# Patient Record
Sex: FEMALE | Race: BLACK OR AFRICAN AMERICAN | Employment: FULL TIME | URBAN - METROPOLITAN AREA
[De-identification: names, ages, dates, MRNs, and addresses within clinical notes are randomized per-mention and may not be internally consistent; named-entity substitution may affect disease eponyms.]

---

## 2017-09-21 ENCOUNTER — OFFICE VISIT (OUTPATIENT)
Dept: INTERNAL MEDICINE CLINIC | Age: 62
End: 2017-09-21

## 2017-09-21 VITALS
HEART RATE: 86 BPM | WEIGHT: 179 LBS | DIASTOLIC BLOOD PRESSURE: 81 MMHG | RESPIRATION RATE: 18 BRPM | TEMPERATURE: 98.6 F | SYSTOLIC BLOOD PRESSURE: 148 MMHG | HEIGHT: 66 IN | BODY MASS INDEX: 28.77 KG/M2 | OXYGEN SATURATION: 98 %

## 2017-09-21 DIAGNOSIS — Z00.00 WELL ADULT ON ROUTINE HEALTH CHECK: Primary | ICD-10-CM

## 2017-09-21 DIAGNOSIS — E04.2 MULTINODULAR GOITER: ICD-10-CM

## 2017-09-21 NOTE — MR AVS SNAPSHOT
Visit Information Date & Time Provider Department Dept. Phone Encounter #  
 9/21/2017  8:00 AM Angel Sullivan MD [facility] [phone] [id number] Follow-up Instructions Return in about 6 months (around 3/21/2018). Upcoming Health Maintenance Date Due DTaP/Tdap/Td series (1 - Tdap) 9/18/1978 ZOSTER VACCINE AGE 60> 7/18/2017 INFLUENZA AGE 9 TO ADULT 8/1/2017 BREAST CANCER SCRN MAMMOGRAM 10/26/2017 PAP AKA CERVICAL CYTOLOGY 12/18/2018 COLONOSCOPY 4/22/2019 Allergies as of 9/21/2017  Review Complete On: 9/21/2017 By: Julia Ochoa MD  
 No Known Allergies Current Immunizations  Reviewed on 12/21/2015 Name Date Influenza Vaccine 9/7/2017, 10/1/2015 Not reviewed this visit You Were Diagnosed With   
  
 Codes Comments Well adult on routine health check    -  Primary ICD-10-CM: Z00.00 ICD-9-CM: V70.0 Multinodular goiter     ICD-10-CM: E04.2 ICD-9-CM: 402. 1 Vitals BP Pulse Temp Resp Height(growth percentile) Weight(growth percentile) 148/81 (BP 1 Location: Left arm, BP Patient Position: Sitting) 86 98.6 °F (37 °C) (Oral) 18 5' 6\" (1.676 m) 179 lb (81.2 kg) LMP SpO2 BMI OB Status Smoking Status 11/08/2010 98% 28.89 kg/m2 Hysterectomy Former Smoker Vitals History BMI and BSA Data Body Mass Index Body Surface Area  
 28.89 kg/m 2 1.94 m 2 Preferred Pharmacy Pharmacy Name Phone [id number], [address] [phone] Your Updated Medication List  
  
   
This list is accurate as of: 9/21/17  8:35 AM.  Always use your most recent med list.  
  
  
  
  
 DIVIGEL 1 mg/gram (0.1 %) Glpk Generic drug:  Estradiol We Performed the Following CBC WITH AUTOMATED DIFF [85948 CPT(R)] LIPID PANEL [13703 CPT(R)] METABOLIC PANEL, COMPREHENSIVE [91613 CPT(R)] TSH AND FREE T4 [63170 CPT(R)] Follow-up Instructions Return in about 6 months (around 3/21/2018). To-Do List   
 09/21/2017 Imaging:  SADA MAMMO BI SCREENING INCL CAD   
  
 09/21/2017 Imaging:  US THYROID/PARATHYROID/SOFT TISS Introducing [facility]! Dear Moe Arch: Thank you for requesting a Sharetribe account. Our records indicate that you already have an active Sharetribe account. You can access your account anytime at [email or web address] Did you know that you can access your hospital and ER discharge instructions at any time in Sharetribe? You can also review all of your test results from your hospital stay or ER visit. Additional Information If you have questions, please visit the Frequently Asked Questions section of the Sharetribe website at [email or web address] Remember, Sharetribe is NOT to be used for urgent needs. For medical emergencies, dial 911. Now available from your iPhone and Android! Please provide this summary of care documentation to your next provider. Your primary care clinician is listed as Angel Beckett. If you have any questions after today's visit, please call [phone].

## 2017-09-21 NOTE — PROGRESS NOTES
Subjective:   63 y.o. female for Well Woman Check. Multinodular goitre. Need a follow up [address]. Patient is asymptomatic. Her gyne and breast care is done elsewhere by her Ob-Gyne physician. Patient Active Problem List   Diagnosis Code    Lymphadenopathy R59.1    Multinodular goiter E04.2    Post menopausal problems N95.9    Hyperlipidemia E78.5    HTN (hypertension) I10     Current Outpatient Prescriptions   Medication Sig Dispense Refill    DIVIGEL 1 mg (0.1 %) glpk        No Known Allergies  Past Medical History:   Diagnosis Date    Hypercholesterolemia     Hypertension     Thyroid disease      Past Surgical History:   Procedure Laterality Date    HX CYST INCISION AND DRAINAGE Left 2010    benign    HX FRACTURE TX Right     HX GYN      tubal ligation.  Fibroid removal.    HX HYSTERECTOMY  03/2016     Family History   Problem Relation Age of Onset    No Known Problems Mother     Cancer Father      Lung cancer    Diabetes Brother     Diabetes Brother      Social History   Substance Use Topics    Smoking status: Former Smoker    Smokeless tobacco: Never Used    Alcohol use Yes      Comment: social        Lab Results  Component Value Date/Time   WBC 4.8 12/21/2015 09:19 AM   HGB 14.1 12/21/2015 09:19 AM   HCT 42.6 12/21/2015 09:19 AM   PLATELET 016 04/35/7351 09:19 AM   MCV 91 12/21/2015 09:19 AM     Lab Results  Component Value Date/Time   Cholesterol, total 223 12/21/2015 09:19 AM   HDL Cholesterol 71 12/21/2015 09:19 AM   LDL, calculated 140 12/21/2015 09:19 AM   Triglyceride 62 12/21/2015 09:19 AM   Lab Results  Component Value Date/Time   GFR est non-AA 76 12/21/2015 09:19 AM   GFR est AA 87 12/21/2015 09:19 AM   Creatinine 0.85 12/21/2015 09:19 AM   BUN 11 12/21/2015 09:19 AM   Sodium 141 12/21/2015 09:19 AM   Potassium 3.9 12/21/2015 09:19 AM   Chloride 104 12/21/2015 09:19 AM   CO2 23 12/21/2015 09:19 AM   Lab Results  Component Value Date/Time   TSH 0.487 12/21/2015 09:19 AM   T4, Free 1.35 12/21/2015 09:19 AM                     ROS: Feeling generally well. No TIA's or unusual headaches, no dysphagia. No prolonged cough. No dyspnea or chest pain on exertion. No abdominal pain, change in bowel habits, black or bloody stools. No urinary tract symptoms. No new or unusual musculoskeletal symptoms. Specific concerns today: none. Objective: The patient appears well, alert, oriented x 3, in no distress. Visit Vitals    /81 (BP 1 Location: Left arm, BP Patient Position: Sitting)    Pulse 86    Temp 98.6 °F (37 °C) (Oral)    Resp 18    Ht 5' 6\" (1.676 m)    Wt 179 lb (81.2 kg)    LMP 11/08/2010    SpO2 98%    BMI 28.89 kg/m2     ENT normal.  Neck supple. No adenopathy. Multinodular goitre on exam more on right. ALVARO. Lungs are clear, good air entry, no wheezes, rhonchi or rales. S1 and S2 normal, no murmurs, regular rate and rhythm. Abdomen soft without tenderness, guarding, mass or organomegaly. Extremities show no edema, normal peripheral pulses. Neurological is normal, no focal findings. Breast and Pelvic exams are deferred. Skin: normal.     Assessment/Plan:   Well Woman  lose weight, increase physical activity, follow low fat diet, follow low salt diet, routine labs ordered, call if any problems    ICD-10-CM ICD-9-CM    1. Well adult on routine health check Z00.00 V70.0 TSH AND FREE T4      LIPID PANEL      METABOLIC PANEL, COMPREHENSIVE      CBC WITH AUTOMATED DIFF      SADA MAMMO BI SCREENING INCL CAD   2. Multinodular goiter E04.2 241.1 US THYROID/PARATHYROID/SOFT TISS      TSH AND FREE T4     Diagnoses and all orders for this visit:    1. Well adult on routine health check  -     TSH AND FREE T4  -     LIPID PANEL  -     METABOLIC PANEL, COMPREHENSIVE  -     CBC WITH AUTOMATED DIFF  -     SADA MAMMO BI SCREENING INCL CAD; Future    2. Multinodular goiter  -     US THYROID/PARATHYROID/SOFT TISS; Future  -     TSH AND FREE T4    BP is slightly up.  Advised to monitor BP and follow up with me if BP stays over 150/90s. Follow-up Disposition:  Return in about 6 months (around 3/21/2018).   reviewed diet, exercise and weight control

## 2017-09-22 LAB
ALBUMIN SERPL-MCNC: 4.3 G/DL (ref 3.6–4.8)
ALBUMIN/GLOB SERPL: 1.7 {RATIO} (ref 1.2–2.2)
ALP SERPL-CCNC: 123 IU/L (ref 39–117)
ALT SERPL-CCNC: 14 IU/L (ref 0–32)
AST SERPL-CCNC: 18 IU/L (ref 0–40)
BASOPHILS # BLD AUTO: 0 X10E3/UL (ref 0–0.2)
BASOPHILS NFR BLD AUTO: 0 %
BILIRUB SERPL-MCNC: 0.5 MG/DL (ref 0–1.2)
BUN SERPL-MCNC: 9 MG/DL (ref 8–27)
BUN/CREAT SERPL: 11 (ref 12–28)
CALCIUM SERPL-MCNC: 9 MG/DL (ref 8.7–10.3)
CHLORIDE SERPL-SCNC: 101 MMOL/L (ref 96–106)
CHOLEST SERPL-MCNC: 209 MG/DL (ref 100–199)
CO2 SERPL-SCNC: 22 MMOL/L (ref 18–29)
CREAT SERPL-MCNC: 0.82 MG/DL (ref 0.57–1)
EOSINOPHIL # BLD AUTO: 0.3 X10E3/UL (ref 0–0.4)
EOSINOPHIL NFR BLD AUTO: 4 %
ERYTHROCYTE [DISTWIDTH] IN BLOOD BY AUTOMATED COUNT: 13.8 % (ref 12.3–15.4)
GLOBULIN SER CALC-MCNC: 2.5 G/DL (ref 1.5–4.5)
GLUCOSE SERPL-MCNC: 88 MG/DL (ref 65–99)
HCT VFR BLD AUTO: 41.3 % (ref 34–46.6)
HDLC SERPL-MCNC: 74 MG/DL
HGB BLD-MCNC: 13.6 G/DL (ref 11.1–15.9)
IMM GRANULOCYTES # BLD: 0 X10E3/UL (ref 0–0.1)
IMM GRANULOCYTES NFR BLD: 0 %
INTERPRETATION, 910389: NORMAL
LDLC SERPL CALC-MCNC: 121 MG/DL (ref 0–99)
LYMPHOCYTES # BLD AUTO: 1.6 X10E3/UL (ref 0.7–3.1)
LYMPHOCYTES NFR BLD AUTO: 24 %
MCH RBC QN AUTO: 30.2 PG (ref 26.6–33)
MCHC RBC AUTO-ENTMCNC: 32.9 G/DL (ref 31.5–35.7)
MCV RBC AUTO: 92 FL (ref 79–97)
MONOCYTES # BLD AUTO: 0.3 X10E3/UL (ref 0.1–0.9)
MONOCYTES NFR BLD AUTO: 5 %
NEUTROPHILS # BLD AUTO: 4.4 X10E3/UL (ref 1.4–7)
NEUTROPHILS NFR BLD AUTO: 67 %
PLATELET # BLD AUTO: 271 X10E3/UL (ref 150–379)
POTASSIUM SERPL-SCNC: 4.2 MMOL/L (ref 3.5–5.2)
PROT SERPL-MCNC: 6.8 G/DL (ref 6–8.5)
RBC # BLD AUTO: 4.51 X10E6/UL (ref 3.77–5.28)
SODIUM SERPL-SCNC: 141 MMOL/L (ref 134–144)
T4 FREE SERPL-MCNC: 1.05 NG/DL (ref 0.82–1.77)
TRIGL SERPL-MCNC: 71 MG/DL (ref 0–149)
TSH SERPL DL<=0.005 MIU/L-ACNC: 0.54 UIU/ML (ref 0.45–4.5)
VLDLC SERPL CALC-MCNC: 14 MG/DL (ref 5–40)
WBC # BLD AUTO: 6.6 X10E3/UL (ref 3.4–10.8)

## 2017-09-22 NOTE — PROGRESS NOTES
Please inform patient that :    Thyroid, CBC, kidney, liver function is fine. Cholesterol level is better than before. Continue work on diet and exercise. Follow up in one year.

## 2017-09-30 ENCOUNTER — HOSPITAL ENCOUNTER (OUTPATIENT)
Dept: ULTRASOUND IMAGING | Age: 62
Discharge: HOME | End: 2017-09-30
Attending: FAMILY MEDICINE
Payer: COMMERCIAL

## 2017-09-30 DIAGNOSIS — E04.2 MULTINODULAR GOITER: ICD-10-CM

## 2017-09-30 PROCEDURE — 76536 US EXAM OF HEAD AND NECK: CPT

## 2017-10-02 NOTE — PROGRESS NOTES
Please call patient:    Multinodular goiter is again seen. The gland overall has enlarged according to the radiologist comment, but they appear relatively unchanged. F/u in one year.

## 2017-10-30 ENCOUNTER — HOSPITAL ENCOUNTER (OUTPATIENT)
Dept: MAMMOGRAPHY | Age: 62
Discharge: HOME | End: 2017-10-30
Attending: FAMILY MEDICINE
Payer: COMMERCIAL

## 2017-10-30 DIAGNOSIS — Z00.00 WELL ADULT ON ROUTINE HEALTH CHECK: ICD-10-CM

## 2017-10-30 PROCEDURE — 77067 SCR MAMMO BI INCL CAD: CPT

## 2018-06-19 ENCOUNTER — OFFICE VISIT (OUTPATIENT)
Dept: INTERNAL MEDICINE CLINIC | Age: 63
End: 2018-06-19

## 2018-06-19 VITALS
RESPIRATION RATE: 18 BRPM | TEMPERATURE: 97.2 F | DIASTOLIC BLOOD PRESSURE: 88 MMHG | BODY MASS INDEX: 28.73 KG/M2 | SYSTOLIC BLOOD PRESSURE: 142 MMHG | OXYGEN SATURATION: 98 % | HEART RATE: 69 BPM | WEIGHT: 178.8 LBS | HEIGHT: 66 IN

## 2018-06-19 DIAGNOSIS — R03.0 ELEVATED BLOOD PRESSURE READING WITHOUT DIAGNOSIS OF HYPERTENSION: ICD-10-CM

## 2018-06-19 DIAGNOSIS — Z78.0 POST-MENOPAUSAL: ICD-10-CM

## 2018-06-19 DIAGNOSIS — Z00.00 WELL ADULT ON ROUTINE HEALTH CHECK: Primary | ICD-10-CM

## 2018-06-19 NOTE — PROGRESS NOTES
Subjective:   63 y.o. female is here for Well Woman Check. Her medical history is significant for goitre. Never been diagnosed with HTN. Follows healthy diet. Her gyne and breast care is done elsewhere by her Ob-Gyne physician. Patient Active Problem List   Diagnosis Code    Lymphadenopathy R59.1    Multinodular goiter E04.2    Post menopausal problems N95.9    Hyperlipidemia E78.5    HTN (hypertension) I10    Elevated blood pressure reading without diagnosis of hypertension R03.0     Current Outpatient Prescriptions   Medication Sig Dispense Refill    DIVIGEL 1 mg (0.1 %) glpk        No Known Allergies  Past Medical History:   Diagnosis Date    Hypercholesterolemia     Hypertension     Menopause     Thyroid disease      Past Surgical History:   Procedure Laterality Date    HX CYST INCISION AND DRAINAGE Left 2010    benign    HX FRACTURE TX Right     HX GYN      tubal ligation. Fibroid removal.    HX HYSTERECTOMY  03/2016     Social History   Substance Use Topics    Smoking status: Former Smoker    Smokeless tobacco: Never Used    Alcohol use Yes      Comment: social        Lab Results  Component Value Date/Time   Cholesterol, total 209 (H) 09/21/2017 08:49 AM   HDL Cholesterol 74 09/21/2017 08:49 AM   LDL, calculated 121 (H) 09/21/2017 08:49 AM   Triglyceride 71 09/21/2017 08:49 AM     Lab Results  Component Value Date/Time   GFR est non-AA 78 09/21/2017 08:49 AM   GFR est AA 90 09/21/2017 08:49 AM   Creatinine 0.82 09/21/2017 08:49 AM   BUN 9 09/21/2017 08:49 AM   Sodium 141 09/21/2017 08:49 AM   Potassium 4.2 09/21/2017 08:49 AM   Chloride 101 09/21/2017 08:49 AM   CO2 22 09/21/2017 08:49 AM     Lab Results  Component Value Date/Time   TSH 0.537 09/21/2017 08:49 AM   T4, Free 1.05 09/21/2017 08:49 AM      Lab Results   Component Value Date/Time    Hemoglobin A1c 5.8 (H) 12/21/2015 09:19 AM         ROS: Feeling generally well. No TIA's or unusual headaches, no dysphagia.   No prolonged cough. No dyspnea or chest pain on exertion. No abdominal pain, change in bowel habits, black or bloody stools. No urinary tract symptoms. No new or unusual musculoskeletal symptoms. Specific concerns today: None. Objective: The patient appears well, alert, oriented x 3, in no distress. Visit Vitals    /84 (BP 1 Location: Left arm, BP Patient Position: Sitting)    Pulse 69    Temp 97.2 °F (36.2 °C) (Temporal)    Resp 18    Ht 5' 6\" (1.676 m)    Wt 178 lb 12.8 oz (81.1 kg)    LMP 11/08/2010    SpO2 98%    BMI 28.86 kg/m2     ENT normal.  Neck supple. No adenopathy or thyromegaly. ALVARO. Lungs are clear, good air entry, no wheezes, rhonchi or rales. S1 and S2 normal, no murmurs, regular rate and rhythm. Abdomen soft without tenderness, guarding, mass or organomegaly. Extremities show no edema, normal peripheral pulses. Neurological is normal, no focal findings. Breast and Pelvic exams are deferred. Assessment/Plan:   Well Woman  lose weight, limit alcohol consumption, follow low fat diet, follow low salt diet, routine labs ordered, call if any problems    ICD-10-CM ICD-9-CM    1. Well adult on routine health check Z00.00 V70.0 CBC WITH AUTOMATED DIFF      TSH AND FREE T4      METABOLIC PANEL, COMPREHENSIVE      LIPID PANEL      HEMOGLOBIN A1C WITH EAG   2. Post-menopausal Z78.0 V49.81 DEXA BONE DENSITY STUDY AXIAL   3. BMI 28.0-28.9,adult Z68.28 V85.24    4. Elevated blood pressure reading without diagnosis of hypertension R03.0 796.2      Diagnoses and all orders for this visit:    1. Well adult on routine health check  -     CBC WITH AUTOMATED DIFF  -     TSH AND FREE T4  -     METABOLIC PANEL, COMPREHENSIVE  -     LIPID PANEL  -     HEMOGLOBIN A1C WITH EAG    2. Post-menopausal  -     DEXA BONE DENSITY STUDY AXIAL; Future    3. Elevated blood pressure reading without diagnosis of hypertension    - low salt diet. - encouraged exercise.     -  Monitor BP / follow up if BP stays over 140/90s. 4. BMI 28.0-28.9,adult  discussed the patient's BMI with her. The BMI follow up plan is as follows:     dietary management education, guidance, and counseling  encourage exercise  monitor weight        Follow-up Disposition:  Return in about 1 year (around 6/19/2019), or if symptoms worsen or fail to improve, for follow up if BP stays over 140/90. reviewed diet, exercise and weight control  reviewed medications and side effects in detail      An After Visit Summary was printed and given to the patient.

## 2018-06-19 NOTE — MR AVS SNAPSHOT
[facility]. [address] [facility] 
[phone] Patient: Janelle Prado MRN: [id number]:[id number] Visit Information Date & Time Provider Department Dept. Phone Encounter #  
 6/19/2018  8:15 AM Angel Traylor MD [facility] [phone] [id number] Follow-up Instructions Return in about 1 year (around 6/19/2019), or if symptoms worsen or fail to improve, for follow up if BP stays over 140/90. Upcoming Health Maintenance Date Due DTaP/Tdap/Td series (1 - Tdap) 9/18/1978 ZOSTER VACCINE AGE 60> 7/18/2017 Influenza Age 5 to Adult 8/1/2018 BREAST CANCER SCRN MAMMOGRAM 10/30/2018 COLONOSCOPY 4/22/2019 Allergies as of 6/19/2018  Review Complete On: 6/19/2018 By: Gaston Love MD  
 No Known Allergies Current Immunizations  Reviewed on 12/21/2015 Name Date Influenza Vaccine 9/7/2017, 10/1/2015 Not reviewed this visit You Were Diagnosed With   
  
 Codes Comments Well adult on routine health check    -  Primary ICD-10-CM: Z00.00 ICD-9-CM: V70.0 Post-menopausal     ICD-10-CM: Z78.0 ICD-9-CM: V49.81   
 BMI 28.0-28.9,adult     ICD-10-CM: T54.09 
ICD-9-CM: V85.24 Elevated blood pressure reading without diagnosis of hypertension     ICD-10-CM: R03.0 ICD-9-CM: 796.2 Vitals BP Pulse Temp Resp Height(growth percentile) Weight(growth percentile) 142/88 69 97.2 °F (36.2 °C) (Temporal) 18 5' 6\" (1.676 m) 178 lb 12.8 oz (81.1 kg) LMP SpO2 BMI OB Status Smoking Status 11/08/2010 98% 28.86 kg/m2 Hysterectomy Former Smoker Vitals History BMI and BSA Data Body Mass Index Body Surface Area  
 28.86 kg/m 2 1.94 m 2 Preferred Pharmacy Pharmacy Name Phone [id number] [place], [address] [phone] Your Updated Medication List  
  
   
 This list is accurate as of 6/19/18  9:11 AM.  Always use your most recent med list.  
  
  
  
  
 DIVIGEL 1 mg/gram (0.1 %) Glpk Generic drug:  Estradiol We Performed the Following CBC WITH AUTOMATED DIFF [32915 CPT(R)] HEMOGLOBIN A1C WITH EAG [05624 CPT(R)] LIPID PANEL [46365 CPT(R)] METABOLIC PANEL, COMPREHENSIVE [35007 CPT(R)] TSH AND FREE T4 [18750 CPT(R)] Follow-up Instructions Return in about 1 year (around 6/19/2019), or if symptoms worsen or fail to improve, for follow up if BP stays over 140/90. To-Do List   
 06/19/2018 Imaging:  DEXA BONE DENSITY STUDY AXIAL Patient Instructions Body Mass Index: Care Instructions Your Care Instructions Body mass index (BMI) can help you see if your weight is raising your risk for health problems. It uses a formula to compare how much you weigh with how tall you are. · A BMI lower than 18.5 is considered underweight. · A BMI between 18.5 and 24.9 is considered healthy. · A BMI between 25 and 29.9 is considered overweight. A BMI of 30 or higher is considered obese. If your BMI is in the normal range, it means that you have a lower risk for weight-related health problems. If your BMI is in the overweight or obese range, you may be at increased risk for weight-related health problems, such as high blood pressure, heart disease, stroke, arthritis or joint pain, and diabetes. If your BMI is in the underweight range, you may be at increased risk for health problems such as fatigue, lower protection (immunity) against illness, muscle loss, bone loss, hair loss, and hormone problems. BMI is just one measure of your risk for weight-related health problems. You may be at higher risk for health problems if you are not active, you eat an unhealthy diet, or you drink too much alcohol or use tobacco products. Follow-up care is a key part of your treatment and safety.  Be sure to make and go to all appointments, and call your doctor if you are having problems. It's also a good idea to know your test results and keep a list of the medicines you take. How can you care for yourself at home? · Practice healthy eating habits. This includes eating plenty of fruits, vegetables, whole grains, lean protein, and low-fat dairy. · If your doctor recommends it, get more exercise. Walking is a good choice. Bit by bit, increase the amount you walk every day. Try for at least 30 minutes on most days of the week. · Do not smoke. Smoking can increase your risk for health problems. If you need help quitting, talk to your doctor about stop-smoking programs and medicines. These can increase your chances of quitting for good. · Limit alcohol to 2 drinks a day for men and 1 drink a day for women. Too much alcohol can cause health problems. If you have a BMI higher than 25 · Your doctor may do other tests to check your risk for weight-related health problems. This may include measuring the distance around your waist. A waist measurement of more than 40 inches in men or 35 inches in women can increase the risk of weight-related health problems. · Talk with your doctor about steps you can take to stay healthy or improve your health. You may need to make lifestyle changes to lose weight and stay healthy, such as changing your diet and getting regular exercise. If you have a BMI lower than 18.5 · Your doctor may do other tests to check your risk for health problems. · Talk with your doctor about steps you can take to stay healthy or improve your health. You may need to make lifestyle changes to gain or maintain weight and stay healthy, such as getting more healthy foods in your diet and doing exercises to build muscle. Where can you learn more? Go to http://irma-chandler.info/. Enter S176 in the search box to learn more about \"Body Mass Index: Care Instructions. \" 
 Current as of: October 13, 2016 Content Version: 11.4 © 6160-9651 Healthwise, Teleborder. Care instructions adapted under license by [employer] (which disclaims liability or warranty for this information). If you have questions about a medical condition or this instruction, always ask your healthcare professional. [address] any warranty or liability for your use of this information. Introducing [facility] & [facility]! Dear Binta French: Thank you for requesting a EnticeLabs account. Our records indicate that you already have an active EnticeLabs account. You can access your account anytime at [email or web address] Did you know that you can access your hospital and ER discharge instructions at any time in EnticeLabs? You can also review all of your test results from your hospital stay or ER visit. Additional Information If you have questions, please visit the Frequently Asked Questions section of the EnticeLabs website at [email or web address] Remember, EnticeLabs is NOT to be used for urgent needs. For medical emergencies, dial 911. Now available from your iPhone and Android! Please provide this summary of care documentation to your next provider. Your primary care clinician is listed as Angel Beckett. If you have any questions after today's visit, please call [phone].

## 2018-06-19 NOTE — PATIENT INSTRUCTIONS
Body Mass Index: Care Instructions  Your Care Instructions    Body mass index (BMI) can help you see if your weight is raising your risk for health problems. It uses a formula to compare how much you weigh with how tall you are. · A BMI lower than 18.5 is considered underweight. · A BMI between 18.5 and 24.9 is considered healthy. · A BMI between 25 and 29.9 is considered overweight. A BMI of 30 or higher is considered obese. If your BMI is in the normal range, it means that you have a lower risk for weight-related health problems. If your BMI is in the overweight or obese range, you may be at increased risk for weight-related health problems, such as high blood pressure, heart disease, stroke, arthritis or joint pain, and diabetes. If your BMI is in the underweight range, you may be at increased risk for health problems such as fatigue, lower protection (immunity) against illness, muscle loss, bone loss, hair loss, and hormone problems. BMI is just one measure of your risk for weight-related health problems. You may be at higher risk for health problems if you are not active, you eat an unhealthy diet, or you drink too much alcohol or use tobacco products. Follow-up care is a key part of your treatment and safety. Be sure to make and go to all appointments, and call your doctor if you are having problems. It's also a good idea to know your test results and keep a list of the medicines you take. How can you care for yourself at home? · Practice healthy eating habits. This includes eating plenty of fruits, vegetables, whole grains, lean protein, and low-fat dairy. · If your doctor recommends it, get more exercise. Walking is a good choice. Bit by bit, increase the amount you walk every day. Try for at least 30 minutes on most days of the week. · Do not smoke. Smoking can increase your risk for health problems. If you need help quitting, talk to your doctor about stop-smoking programs and medicines. These can increase your chances of quitting for good. · Limit alcohol to 2 drinks a day for men and 1 drink a day for women. Too much alcohol can cause health problems. If you have a BMI higher than 25  · Your doctor may do other tests to check your risk for weight-related health problems. This may include measuring the distance around your waist. A waist measurement of more than 40 inches in men or 35 inches in women can increase the risk of weight-related health problems. · Talk with your doctor about steps you can take to stay healthy or improve your health. You may need to make lifestyle changes to lose weight and stay healthy, such as changing your diet and getting regular exercise. If you have a BMI lower than 18.5  · Your doctor may do other tests to check your risk for health problems. · Talk with your doctor about steps you can take to stay healthy or improve your health. You may need to make lifestyle changes to gain or maintain weight and stay healthy, such as getting more healthy foods in your diet and doing exercises to build muscle. Where can you learn more? Go to http://irma-chandler.info/. Enter S176 in the search box to learn more about \"Body Mass Index: Care Instructions. \"  Current as of: October 13, 2016  Content Version: 11.4  © 2018-5765 Healthwise, Incorporated. Care instructions adapted under license by [employer] (which disclaims liability or warranty for this information). If you have questions about a medical condition or this instruction, always ask your healthcare professional. [address] any warranty or liability for your use of this information.

## 2018-06-20 LAB
ALBUMIN SERPL-MCNC: 4.3 G/DL (ref 3.6–4.8)
ALBUMIN/GLOB SERPL: 1.8 {RATIO} (ref 1.2–2.2)
ALP SERPL-CCNC: 126 IU/L (ref 39–117)
ALT SERPL-CCNC: 11 IU/L (ref 0–32)
AST SERPL-CCNC: 15 IU/L (ref 0–40)
BASOPHILS # BLD AUTO: 0 X10E3/UL (ref 0–0.2)
BASOPHILS NFR BLD AUTO: 0 %
BILIRUB SERPL-MCNC: 0.4 MG/DL (ref 0–1.2)
BUN SERPL-MCNC: 10 MG/DL (ref 8–27)
BUN/CREAT SERPL: 11 (ref 12–28)
CALCIUM SERPL-MCNC: 9.5 MG/DL (ref 8.7–10.3)
CHLORIDE SERPL-SCNC: 103 MMOL/L (ref 96–106)
CHOLEST SERPL-MCNC: 209 MG/DL (ref 100–199)
CO2 SERPL-SCNC: 25 MMOL/L (ref 20–29)
CREAT SERPL-MCNC: 0.88 MG/DL (ref 0.57–1)
EOSINOPHIL # BLD AUTO: 0.1 X10E3/UL (ref 0–0.4)
EOSINOPHIL NFR BLD AUTO: 3 %
ERYTHROCYTE [DISTWIDTH] IN BLOOD BY AUTOMATED COUNT: 13.5 % (ref 12.3–15.4)
EST. AVERAGE GLUCOSE BLD GHB EST-MCNC: 114 MG/DL
GFR SERPLBLD CREATININE-BSD FMLA CKD-EPI: 72 ML/MIN/1.73
GFR SERPLBLD CREATININE-BSD FMLA CKD-EPI: 83 ML/MIN/1.73
GLOBULIN SER CALC-MCNC: 2.4 G/DL (ref 1.5–4.5)
GLUCOSE SERPL-MCNC: 96 MG/DL (ref 65–99)
HBA1C MFR BLD: 5.6 % (ref 4.8–5.6)
HCT VFR BLD AUTO: 41.5 % (ref 34–46.6)
HDLC SERPL-MCNC: 66 MG/DL
HGB BLD-MCNC: 13.6 G/DL (ref 11.1–15.9)
IMM GRANULOCYTES # BLD: 0 X10E3/UL (ref 0–0.1)
IMM GRANULOCYTES NFR BLD: 0 %
INTERPRETATION, 910389: NORMAL
LDLC SERPL CALC-MCNC: 126 MG/DL (ref 0–99)
LYMPHOCYTES # BLD AUTO: 1.8 X10E3/UL (ref 0.7–3.1)
LYMPHOCYTES NFR BLD AUTO: 36 %
MCH RBC QN AUTO: 30 PG (ref 26.6–33)
MCHC RBC AUTO-ENTMCNC: 32.8 G/DL (ref 31.5–35.7)
MCV RBC AUTO: 92 FL (ref 79–97)
MONOCYTES # BLD AUTO: 0.3 X10E3/UL (ref 0.1–0.9)
MONOCYTES NFR BLD AUTO: 5 %
NEUTROPHILS # BLD AUTO: 2.8 X10E3/UL (ref 1.4–7)
NEUTROPHILS NFR BLD AUTO: 56 %
PLATELET # BLD AUTO: 279 X10E3/UL (ref 150–379)
POTASSIUM SERPL-SCNC: 4.7 MMOL/L (ref 3.5–5.2)
PROT SERPL-MCNC: 6.7 G/DL (ref 6–8.5)
RBC # BLD AUTO: 4.53 X10E6/UL (ref 3.77–5.28)
SODIUM SERPL-SCNC: 142 MMOL/L (ref 134–144)
T4 FREE SERPL-MCNC: 1.2 NG/DL (ref 0.82–1.77)
TRIGL SERPL-MCNC: 86 MG/DL (ref 0–149)
TSH SERPL DL<=0.005 MIU/L-ACNC: 0.46 UIU/ML (ref 0.45–4.5)
VLDLC SERPL CALC-MCNC: 17 MG/DL (ref 5–40)
WBC # BLD AUTO: 5 X10E3/UL (ref 3.4–10.8)

## 2018-06-20 NOTE — PROGRESS NOTES
Result sent through my chart per pt request.     1. CBC, kidney, liver, thyroid level is within normal range. 2. Total cholesterol level and the bad cholesterol level is  mildly elevated and almost the same range as last time. No need to start any medication. Continue watching your diet, eat healthy, less moore and fatty food, more baked or grilled or broiled food. Exercise 150 minutes/week will be helpful as well. Will recheck level in one year.       3. No diabetes

## 2018-06-26 ENCOUNTER — HOSPITAL ENCOUNTER (OUTPATIENT)
Dept: MAMMOGRAPHY | Age: 63
Discharge: HOME | End: 2018-06-26
Attending: FAMILY MEDICINE
Payer: COMMERCIAL

## 2018-06-26 DIAGNOSIS — Z78.0 POST-MENOPAUSAL: ICD-10-CM

## 2018-06-26 PROCEDURE — 77080 DXA BONE DENSITY AXIAL: CPT

## 2018-06-26 NOTE — LETTER
6/27/2018 9:52 AM 
 
Ms. Macdonald [address] [place] [address] [facility] 7 [id number] [address]: 
 
Please find your most recent results below. DEXA scan normal 
 
Resulted Orders DEXA BONE DENSITY STUDY AXIAL Narrative Bone Mineral Density Indication:  Osteoporosis screening Age: 63 Sex: Female. Menopause status: postmenopausal. 
Hormone replacement therapy: No  
 
Number of falls in the past year:   None. Risk factors for osteoporosis:  None. Current medication for osteoporosis: None. Comparison: 2013 Technique: Imaging was performed on the [employer] Excluded sites: None Findings: 
  
  
Femoral Neck Left: 
Bone mineral density (gm/cm2):  0.934 
% of peak bone mass:  90 
% for age matched controls:  90 
T-score:  -0.8 Z-score:  -0.7 Femoral Neck Right: 
Bone mineral density (gm/cm2):  0.95 
% of peak bone mass:  91 
% for age matched controls:  80 
T-score:  -0.6 Z-score:  -0.6 Total Hip Left: 
Bone mineral density (gm/cm2):  1.024 
% of peak bone mass:  102 
% for age matched controls:  97 
T-score:  0.1 Z-score:  -0.3 Total Hip Right: 
Bone mineral density (gm/cm2):  1.031 
% of peak bone mass:  102 
% for age matched controls:  98 
T-score:  0.2 Z-score:  -0.2 Lumbar Spine:  L1-L4 Bone mineral density (gm/cm2):  1.253 
% of peak bone mass:  105 
% for age matched controls:  80 T-score:  0.5 Z-score:  0.5 
 
33% Radius Left: 
Bone mineral density (gm/cm2):  0.934 
% of peak bone mass:  107 
% for age matched controls:  80 T-score:  0.7 Z-score:  0.9 Impression Impression: This patient is normal using the World Health Organization criteria As compared to the prior study, there has been a statistically significant 
decrease in bone mineral density. RECOMMENDATIONS: 
 
Repeat in two years. Keep up the good work! Please call me if you have any questions: [phone] Sincerely, [facility] DEXA 1

## 2018-10-31 ENCOUNTER — HOSPITAL ENCOUNTER (OUTPATIENT)
Dept: MAMMOGRAPHY | Age: 63
Discharge: HOME | End: 2018-10-31
Attending: FAMILY MEDICINE
Payer: COMMERCIAL

## 2018-10-31 DIAGNOSIS — Z12.31 VISIT FOR SCREENING MAMMOGRAM: ICD-10-CM

## 2018-10-31 PROCEDURE — 77063 BREAST TOMOSYNTHESIS BI: CPT

## 2019-06-14 DIAGNOSIS — Z00.00 WELL ADULT ON ROUTINE HEALTH CHECK: Primary | ICD-10-CM

## 2019-06-15 LAB
ALBUMIN SERPL-MCNC: 4.4 G/DL (ref 3.6–4.8)
ALBUMIN/GLOB SERPL: 1.3 {RATIO} (ref 1.2–2.2)
ALP SERPL-CCNC: 110 IU/L (ref 39–117)
ALT SERPL-CCNC: 20 IU/L (ref 0–32)
AST SERPL-CCNC: 25 IU/L (ref 0–40)
BASOPHILS # BLD AUTO: 0 X10E3/UL (ref 0–0.2)
BASOPHILS NFR BLD AUTO: 0 %
BILIRUB SERPL-MCNC: 0.3 MG/DL (ref 0–1.2)
BUN SERPL-MCNC: 11 MG/DL (ref 8–27)
BUN/CREAT SERPL: 12 (ref 12–28)
CALCIUM SERPL-MCNC: 9.4 MG/DL (ref 8.7–10.3)
CHLORIDE SERPL-SCNC: 101 MMOL/L (ref 96–106)
CHOLEST SERPL-MCNC: 270 MG/DL (ref 100–199)
CO2 SERPL-SCNC: 26 MMOL/L (ref 20–29)
CREAT SERPL-MCNC: 0.91 MG/DL (ref 0.57–1)
EOSINOPHIL # BLD AUTO: 0.2 X10E3/UL (ref 0–0.4)
EOSINOPHIL NFR BLD AUTO: 4 %
ERYTHROCYTE [DISTWIDTH] IN BLOOD BY AUTOMATED COUNT: 18.5 % (ref 12.3–15.4)
EST. AVERAGE GLUCOSE BLD GHB EST-MCNC: 114 MG/DL
GLOBULIN SER CALC-MCNC: 3.4 G/DL (ref 1.5–4.5)
GLUCOSE SERPL-MCNC: 84 MG/DL (ref 65–99)
HBA1C MFR BLD: 5.6 % (ref 4.8–5.6)
HCT VFR BLD AUTO: 41.9 % (ref 34–46.6)
HDLC SERPL-MCNC: 86 MG/DL
HGB BLD-MCNC: 13.3 G/DL (ref 11.1–15.9)
IMM GRANULOCYTES # BLD AUTO: 0 X10E3/UL (ref 0–0.1)
IMM GRANULOCYTES NFR BLD AUTO: 0 %
LDLC SERPL CALC-MCNC: 168 MG/DL (ref 0–99)
LYMPHOCYTES # BLD AUTO: 2 X10E3/UL (ref 0.7–3.1)
LYMPHOCYTES NFR BLD AUTO: 46 %
MCH RBC QN AUTO: 28.7 PG (ref 26.6–33)
MCHC RBC AUTO-ENTMCNC: 31.7 G/DL (ref 31.5–35.7)
MCV RBC AUTO: 91 FL (ref 79–97)
MONOCYTES # BLD AUTO: 0.2 X10E3/UL (ref 0.1–0.9)
MONOCYTES NFR BLD AUTO: 5 %
NEUTROPHILS # BLD AUTO: 2 X10E3/UL (ref 1.4–7)
NEUTROPHILS NFR BLD AUTO: 45 %
PLATELET # BLD AUTO: 313 X10E3/UL (ref 150–450)
POTASSIUM SERPL-SCNC: 4 MMOL/L (ref 3.5–5.2)
PROT SERPL-MCNC: 7.8 G/DL (ref 6–8.5)
RBC # BLD AUTO: 4.63 X10E6/UL (ref 3.77–5.28)
SODIUM SERPL-SCNC: 141 MMOL/L (ref 134–144)
T4 FREE SERPL-MCNC: 0.28 NG/DL (ref 0.82–1.77)
TRIGL SERPL-MCNC: 79 MG/DL (ref 0–149)
TSH SERPL DL<=0.005 MIU/L-ACNC: 51 UIU/ML (ref 0.45–4.5)
VLDLC SERPL CALC-MCNC: 16 MG/DL (ref 5–40)
WBC # BLD AUTO: 4.4 X10E3/UL (ref 3.4–10.8)

## 2019-06-21 ENCOUNTER — OFFICE VISIT (OUTPATIENT)
Dept: PRIMARY CARE CLINIC | Age: 64
End: 2019-06-21

## 2019-06-21 VITALS
HEART RATE: 65 BPM | DIASTOLIC BLOOD PRESSURE: 100 MMHG | BODY MASS INDEX: 28.64 KG/M2 | RESPIRATION RATE: 17 BRPM | WEIGHT: 178.2 LBS | SYSTOLIC BLOOD PRESSURE: 170 MMHG | OXYGEN SATURATION: 100 % | HEIGHT: 66 IN | TEMPERATURE: 98.5 F

## 2019-06-21 DIAGNOSIS — Z00.00 WELL ADULT ON ROUTINE HEALTH CHECK: Primary | ICD-10-CM

## 2019-06-21 DIAGNOSIS — E78.5 HYPERLIPIDEMIA LDL GOAL <100: ICD-10-CM

## 2019-06-21 DIAGNOSIS — I10 ESSENTIAL HYPERTENSION: ICD-10-CM

## 2019-06-21 DIAGNOSIS — E03.9 ACQUIRED HYPOTHYROIDISM: ICD-10-CM

## 2019-06-21 RX ORDER — LEVOTHYROXINE SODIUM 75 UG/1
75 TABLET ORAL
Qty: 30 TAB | Refills: 2 | Status: SHIPPED | OUTPATIENT
Start: 2019-06-21 | End: 2019-08-02 | Stop reason: SDUPTHER

## 2019-06-21 RX ORDER — AMLODIPINE BESYLATE 5 MG/1
5 TABLET ORAL DAILY
Qty: 30 TAB | Refills: 2 | Status: SHIPPED | OUTPATIENT
Start: 2019-06-21 | End: 2019-08-02 | Stop reason: SDUPTHER

## 2019-06-21 NOTE — PROGRESS NOTES
Subjective:   66 y.o. female for Well Woman Check. Blood work ws done already. Reports that she has been closely watching her diet. No exercise yet. Her gyne and breast care is done elsewhere by her Ob-Gyne physician. Patient Active Problem List   Diagnosis Code    Lymphadenopathy R59.1    Multinodular goiter E04.2    Post menopausal problems N95.9    Hyperlipidemia E78.5    HTN (hypertension) I10    Elevated blood pressure reading without diagnosis of hypertension R03.0     Current Outpatient Medications   Medication Sig Dispense Refill    levothyroxine (SYNTHROID) 75 mcg tablet Take 1 Tab by mouth Daily (before breakfast). 30 Tab 2    amLODIPine (NORVASC) 5 mg tablet Take 1 Tab by mouth daily. 30 Tab 2     Allergies   Allergen Reactions    Amoxicillin Hives     Past Medical History:   Diagnosis Date    Hypercholesterolemia     Hypertension     Menopause     Thyroid disease      Past Surgical History:   Procedure Laterality Date    HX CYST INCISION AND DRAINAGE Left 2010    benign    HX FRACTURE TX Right     HX GYN      tubal ligation.  Fibroid removal.    HX HYSTERECTOMY  03/2016     Family History   Problem Relation Age of Onset    No Known Problems Mother     Cancer Father         Lung cancer    Diabetes Brother     Diabetes Brother     Breast Cancer Cousin      Social History     Tobacco Use    Smoking status: Former Smoker    Smokeless tobacco: Never Used   Substance Use Topics    Alcohol use: Yes     Comment: social        Lab Results   Component Value Date/Time    WBC 4.4 06/14/2019 02:06 PM    HGB 13.3 06/14/2019 02:06 PM    HCT 41.9 06/14/2019 02:06 PM    PLATELET 867 32/82/5226 02:06 PM    MCV 91 06/14/2019 02:06 PM     Lab Results   Component Value Date/Time    Cholesterol, total 270 (H) 06/14/2019 02:06 PM    HDL Cholesterol 86 06/14/2019 02:06 PM    LDL, calculated 168 (H) 06/14/2019 02:06 PM    Triglyceride 79 06/14/2019 02:06 PM     Lab Results   Component Value Date/Time ALT (SGPT) 20 06/14/2019 02:06 PM    AST (SGOT) 25 06/14/2019 02:06 PM    Alk. phosphatase 110 06/14/2019 02:06 PM    Bilirubin, total 0.3 06/14/2019 02:06 PM    Albumin 4.4 06/14/2019 02:06 PM    Protein, total 7.8 06/14/2019 02:06 PM    PLATELET 515 62/79/9494 02:06 PM     Lab Results   Component Value Date/Time    GFR est non-AA 68 06/14/2019 02:06 PM    GFR est AA 79 06/14/2019 02:06 PM    Creatinine 0.91 06/14/2019 02:06 PM    BUN 11 06/14/2019 02:06 PM    Sodium 141 06/14/2019 02:06 PM    Potassium 4.0 06/14/2019 02:06 PM    Chloride 101 06/14/2019 02:06 PM    CO2 26 06/14/2019 02:06 PM     Lab Results   Component Value Date/Time    TSH 51.000 (H) 06/14/2019 02:09 PM    TSH 0.456 06/19/2018 09:12 AM    T4, Free 1.20 06/19/2018 09:12 AM      Lab Results   Component Value Date/Time    Hemoglobin A1c 5.6 06/14/2019 02:06 PM         ROS: Feeling generally well. No TIA's or unusual headaches, no dysphagia. No prolonged cough. No dyspnea or chest pain on exertion. No abdominal pain, change in bowel habits, black or bloody stools. No urinary tract symptoms. No new or unusual musculoskeletal symptoms. Specific concerns today: none. Objective: The patient appears well, alert, oriented x 3, in no distress. Visit Vitals  /82 (BP 1 Location: Right arm, BP Patient Position: Sitting)   Pulse 65   Temp 98.5 °F (36.9 °C) (Oral)   Resp 17   Ht 5' 6\" (1.676 m)   Wt 178 lb 3.2 oz (80.8 kg)   LMP 11/08/2010   SpO2 100%   BMI 28.76 kg/m²     ENT normal.  Neck supple. No adenopathy. Goitre. ALVARO. Lungs are clear, good air entry, no wheezes, rhonchi or rales. S1 and S2 normal, no murmurs, regular rate and rhythm. Abdomen soft without tenderness, guarding, mass or organomegaly. Extremities show no edema, normal peripheral pulses. Neurological is normal, no focal findings. Breast and Pelvic exams are deferred.     Assessment/Plan:   Well Woman  lose weight, increase physical activity, bring BP log to office visit, follow low fat diet, follow low salt diet, routine labs ordered, have labs drawn prior to [address]    1. Well adult on routine health check Z00.00 V70.0 levothyroxine (SYNTHROID) 75 mcg tablet   2. Acquired hypothyroidism E03.9 244.9    3. Hyperlipidemia LDL goal <100 E78.5 272.4    4. Essential hypertension I10 401.9 amLODIPine (NORVASC) 5 mg tablet     Diagnoses and all orders for this visit:    1. Well adult on routine health check       - labs discussed . Same as #2,3,4  2. Acquired hypothyroidism  -   Hypothyroid noted in recent lab. Start   levothyroxine (SYNTHROID) 75 mcg tablet; Take 1 Tab by mouth Daily (before breakfast). 3. Hyperlipidemia LDL goal <100      LDL level is moderately high. Advised continue work on diet and exercise. Will repeat lab in three months and depending in that will consider medication management. 4. Essential hypertension  -  Repeat BP is elevated. Start   amLODIPine (NORVASC) 5 mg tablet; Take 1 Tab by mouth daily. Follow-up and Dispositions    · Return in about 6 weeks (around 8/2/2019) for thyroid. ,, Bring BP log book. .       reviewed diet, exercise and weight control  reviewed medications and side effects in detail    Medication risks/benefits/costs/interactions/alternatives discussed with patient. Advised patient to call back or return to office if symptoms worsen/change/persist. If patient cannot reach us or should anything more severe/urgent arise he/she should proceed directly to the nearest emergency department. Discussed expected course/resolution/complications of diagnosis in detail with patient. Patient given a written after visit summary which includes her diagnoses, current medications and vitals. Patient expressed understanding with the diagnosis and plan.

## 2019-06-21 NOTE — PATIENT INSTRUCTIONS
Learning About High Cholesterol What is high cholesterol? Cholesterol is a type of fat in your blood. It is needed for many body functions, such as making new cells. Cholesterol is made by your body. It also comes from food you eat. If you have too much cholesterol, it starts to build up in your arteries. This is called hardening of the arteries, or atherosclerosis. High cholesterol raises your risk of a heart attack and stroke. There are different types of cholesterol. LDL is the \"bad\" cholesterol. High LDL can raise your risk for heart disease, heart attack, and stroke. HDL is the \"good\" cholesterol. High HDL is linked with a lower risk for heart disease, heart attack, and stroke. Your cholesterol levels help your doctor find out your risk for having a heart attack or stroke. How can you prevent high cholesterol? A heart-healthy lifestyle can help you prevent high cholesterol. This lifestyle helps lower your risk for a heart attack and stroke. · Eat heart-healthy foods. ? Eat fruits, vegetables, whole grains (like oatmeal), dried beans and peas, nuts and seeds, soy products (like tofu), and fat-free or low-fat dairy products. ? Replace butter, margarine, and hydrogenated or partially hydrogenated oils with olive and canola oils. (Canola oil margarine without trans fat is fine.) ? Replace red meat with fish, poultry, and soy protein (like tofu). ? Limit processed and packaged foods like chips, crackers, and cookies. · Be active. Exercise can improve your cholesterol level. Get at least 30 minutes of exercise on most days of the week. Walking is a good choice. You also may want to do other activities, such as running, swimming, cycling, or playing tennis or team sports. · Stay at a healthy weight. Lose weight if you need to. · Don't smoke. If you need help quitting, talk to your doctor about stop-smoking programs and medicines. These can increase your chances of quitting for good. How is high cholesterol treated? The goal of treatment is to reduce your chances of having a heart attack or stroke. The goal is not to lower your cholesterol numbers only. · You may make lifestyle changes, such as eating healthy foods, not smoking, losing weight, and being more active. · You may have to take medicine. Follow-up care is a key part of your treatment and safety. Be sure to make and go to all appointments, and call your doctor if you are having problems. It's also a good idea to know your test results and keep a list of the medicines you take. Where can you learn more? Go to http://irma-chandler.info/. Enter V480 in the search box to learn more about \"Learning About High Cholesterol. \" Current as of: July 22, 2018 Content Version: 11.9 © 5427-8397 [employer]. Care instructions adapted under license by [employer] (which disclaims liability or warranty for this information). If you have questions about a medical condition or this instruction, always ask your healthcare professional. [place] [address] any warranty or liability for your use of this information. Learning About High Blood Pressure What is high blood pressure? Blood pressure is a measure of how hard the blood pushes against the walls of your arteries. It's normal for blood pressure to go up and down throughout the day, but if it stays up, you have high blood pressure. Another name for high blood pressure is hypertension. Two numbers tell you your blood pressure. The first number is the systolic pressure. It shows how hard the blood pushes when your heart is pumping. The second number is the diastolic pressure. It shows how hard the blood pushes between heartbeats, when your heart is relaxed and filling with blood. Your doctor will give you a goal for your blood pressure. Your goal will be based on your health and your age.  High blood pressure (hypertension) means that the top number stays high, or the bottom number stays high, or both. High blood pressure increases the risk of stroke, heart attack, and other problems. You and your doctor will talk about your risks of these problems based on your blood pressure. What happens when you have high blood pressure? · Blood flows through your arteries with too much force. Over time, this damages the walls of your arteries. But you can't feel it. High blood pressure usually doesn't cause symptoms. · Fat and calcium start to build up in your arteries. This buildup is called plaque. Plaque makes your arteries narrower and stiffer. Blood can't flow through them as easily. · This lack of good blood flow starts to damage some of the organs in your body. This can lead to problems such as coronary artery disease and heart attack, heart failure, stroke, kidney failure, and eye damage. How can you prevent high blood pressure? · Stay at a healthy weight. · Try to limit how much sodium you eat to less than 2,300 milligrams (mg) a day. If you limit your sodium to 1,500 mg a day, you can lower your blood pressure even more. ? Buy foods that are labeled \"unsalted,\" \"sodium-free,\" or \"low-sodium. \" Foods labeled \"reduced-sodium\" and \"light sodium\" may still have too much sodium. ? Flavor your food with garlic, lemon juice, onion, vinegar, herbs, and spices instead of salt. Do not use soy sauce, steak sauce, onion salt, garlic salt, mustard, or ketchup on your food. ? Use less salt (or none) when recipes call for it. You can often use half the salt a recipe calls for without losing flavor. · Be physically active. Get at least 30 minutes of exercise on most days of the week. Walking is a good choice. You also may want to do other activities, such as running, swimming, cycling, or playing tennis or team sports. · Limit alcohol to 2 drinks a day for men and 1 drink a day for women. · Eat plenty of fruits, vegetables, and low-fat dairy products. Eat less saturated and total fats. How is high blood pressure treated? · Your doctor will suggest making lifestyle changes. For example, your doctor may ask you to eat healthy foods, quit smoking, lose extra weight, and be more active. · If lifestyle changes don't help enough, your doctor may recommend that you take medicine. · When blood pressure is very high, medicines are needed to lower it. Follow-up care is a key part of your treatment and safety. Be sure to make and go to all appointments, and call your doctor if you are having problems. It's also a good idea to know your test results and keep a list of the medicines you take. Where can you learn more? Go to http://irma-chandler.info/. Enter P501 in the search box to learn more about \"Learning About High Blood Pressure. \" Current as of: July 22, 2018 Content Version: 11.9 © 4418-7984 [employer]. Care instructions adapted under license by [employer] (which disclaims liability or warranty for this information). If you have questions about a medical condition or this instruction, always ask your healthcare professional. [address] any warranty or liability for your use of this information.

## 2019-07-29 DIAGNOSIS — E03.9 ACQUIRED HYPOTHYROIDISM: Primary | ICD-10-CM

## 2019-07-30 DIAGNOSIS — E03.9 ACQUIRED HYPOTHYROIDISM: Primary | ICD-10-CM

## 2019-07-30 LAB
T4 FREE SERPL-MCNC: 1.23 NG/DL (ref 0.82–1.77)
TSH SERPL DL<=0.005 MIU/L-ACNC: 1.57 UIU/ML (ref 0.45–4.5)

## 2019-08-02 ENCOUNTER — OFFICE VISIT (OUTPATIENT)
Dept: PRIMARY CARE CLINIC | Age: 64
End: 2019-08-02

## 2019-08-02 VITALS
SYSTOLIC BLOOD PRESSURE: 145 MMHG | RESPIRATION RATE: 16 BRPM | HEART RATE: 66 BPM | WEIGHT: 180.6 LBS | OXYGEN SATURATION: 100 % | BODY MASS INDEX: 29.02 KG/M2 | DIASTOLIC BLOOD PRESSURE: 80 MMHG | HEIGHT: 66 IN | TEMPERATURE: 98.4 F

## 2019-08-02 DIAGNOSIS — E03.9 ACQUIRED HYPOTHYROIDISM: Primary | ICD-10-CM

## 2019-08-02 DIAGNOSIS — I10 ESSENTIAL HYPERTENSION: ICD-10-CM

## 2019-08-02 RX ORDER — LEVOTHYROXINE SODIUM 75 UG/1
75 TABLET ORAL
Qty: 30 TAB | Refills: 2 | Status: SHIPPED | OUTPATIENT
Start: 2019-08-02 | End: 2019-10-17 | Stop reason: SDUPTHER

## 2019-08-02 RX ORDER — AMLODIPINE BESYLATE 5 MG/1
5 TABLET ORAL DAILY
Qty: 30 TAB | Refills: 2 | Status: SHIPPED | OUTPATIENT
Start: 2019-08-02 | End: 2019-10-17 | Stop reason: SDUPTHER

## 2019-08-02 NOTE — PROGRESS NOTES
Salvador Soriano is a 66 y.o. female    Chief Complaint   Patient presents with    Blood Pressure Check    Thyroid Problem       1. Have you been to the ER, urgent care clinic since your last visit? Hospitalized since your last visit? No    2. Have you seen or consulted any other health care providers outside of the [address] [place] [address] since your last visit? Include any pap smears or colon screening. No    No flowsheet data found.      Health Maintenance Due   Topic Date Due    Shingrix Vaccine Age 49> (1 of 2) 09/18/2007    Influenza Age 5 to Adult  08/01/2019    BREAST CANCER SCRN MAMMOGRAM  10/31/2019

## 2019-08-02 NOTE — PROGRESS NOTES
Subjective:     Chief Complaint   Patient presents with    Blood Pressure Check    Thyroid Problem        She  is a 66y.o. year old [place]  female who presents today for follow up on blood pressure as well as thyroid check up. Blood  work was done already. HTN: She was started on Amlodipine 5 mg about 6 weeks ago. BP has been running in 140s/80s range at home. She follows healthy lifestyle . Denies any chest pain, soa, cough. She mention that she notice some leg tightness in her leg otherwise no med side effects. Hypothyroid: She was started on synthroid 75 mcg about 6 weeks ago with a TSH level of 51 and low T4 level. Reports that she is tolerating the med fine. Compliant. Lab Results   Component Value Date/Time    TSH 1.570 07/29/2019 03:34 PM    TSH 0.456 06/19/2018 09:12 AM    T4, Free 1.23 07/29/2019 03:34 PM         Pertinent items are noted in HPI.   Objective:     Vitals:    08/02/19 0805 08/02/19 0820   BP: 147/84 145/80   Pulse: 66    Resp: 16    Temp: 98.4 °F (36.9 °C)    TempSrc: Oral    SpO2: 100%    Weight: 180 lb 9.6 oz (81.9 kg)    Height: 5' 6\" (1.676 m)        Physical Examination: General appearance - alert, well appearing, and in no distress, oriented to person, place, and time and overweight  Mental status - alert, oriented to person, place, and time, normal mood, behavior, speech, dress, motor activity, and thought processes  Chest - clear to auscultation, no wheezes, rales or rhonchi, symmetric air entry  Heart - normal rate, regular rhythm, normal S1, S2, no murmurs, rubs, clicks or gallops  Extremities - no pedal edema noted    Allergies   Allergen Reactions    Amoxicillin Hives      Social History     Socioeconomic History    Marital status: SINGLE     Spouse name: Not on file    Number of children: Not on file    Years of education: Not on file    Highest education level: Not on file   Tobacco Use    Smoking status: Former Smoker    Smokeless tobacco: Never Used Substance and Sexual Activity    Alcohol use: Yes     Comment: social    Drug use: No    Sexual activity: Not Currently      Family History   Problem Relation Age of Onset    No Known Problems Mother     Cancer Father         Lung cancer    Diabetes Brother     Diabetes Brother     Breast Cancer Cousin       Past Surgical History:   Procedure Laterality Date    HX CYST INCISION AND DRAINAGE Left 2010    benign    HX FRACTURE TX Right     HX GYN      tubal ligation. Fibroid removal.    HX HYSTERECTOMY  03/2016      Past Medical History:   Diagnosis Date    Hypercholesterolemia     Hypertension     Menopause     Thyroid disease       Current Outpatient Medications   Medication Sig Dispense Refill    levothyroxine (SYNTHROID) 75 mcg tablet Take 1 Tab by mouth Daily (before breakfast). 30 Tab 2    amLODIPine (NORVASC) 5 mg tablet Take 1 Tab by mouth daily. 30 Tab 2        Assessment/ Plan:   Diagnoses and all orders for this visit:    1. Acquired hypothyroidism  -  Well controlled. Continue    levothyroxine (SYNTHROID) 75 mcg tablet; Take 1 Tab by mouth Daily (before breakfast). 2. Essential hypertension  -  BP is uncontrolled today. . Will contunue amLODIPine (NORVASC) 5 mg tablet; Take 1 Tab by mouth daily. Monitor BP. 3. BMI 29.0-29.9,adult     - counseled on continue maintain diet and work on exercise. Medication risks/benefits/costs/interactions/alternatives discussed with patient. Advised patient to call back or return to office if symptoms worsen/change/persist. If patient cannot reach us or should anything more severe/urgent arise he/she should proceed directly to the nearest emergency department. Discussed expected course/resolution/complications of diagnosis in detail with patient. Patient given a written after visit summary which includes her diagnoses, current medications and vitals. Patient expressed understanding with the diagnosis and plan. Follow-up and Dispositions    · Return in about 2 months (around 10/2/2019) for Bring BP log book. , thyroid.

## 2019-10-09 DIAGNOSIS — E03.9 ACQUIRED HYPOTHYROIDISM: Primary | ICD-10-CM

## 2019-10-09 DIAGNOSIS — I10 ESSENTIAL HYPERTENSION: ICD-10-CM

## 2019-10-10 LAB
BUN SERPL-MCNC: 16 MG/DL (ref 8–27)
BUN/CREAT SERPL: 21 (ref 12–28)
CALCIUM SERPL-MCNC: 9.2 MG/DL (ref 8.7–10.3)
CHLORIDE SERPL-SCNC: 104 MMOL/L (ref 96–106)
CO2 SERPL-SCNC: 23 MMOL/L (ref 20–29)
CREAT SERPL-MCNC: 0.75 MG/DL (ref 0.57–1)
GLUCOSE SERPL-MCNC: 80 MG/DL (ref 65–99)
POTASSIUM SERPL-SCNC: 4.2 MMOL/L (ref 3.5–5.2)
SODIUM SERPL-SCNC: 141 MMOL/L (ref 134–144)
T4 FREE SERPL-MCNC: 1.55 NG/DL (ref 0.82–1.77)
TSH SERPL DL<=0.005 MIU/L-ACNC: 0.03 UIU/ML (ref 0.45–4.5)

## 2019-10-17 ENCOUNTER — OFFICE VISIT (OUTPATIENT)
Dept: PRIMARY CARE CLINIC | Age: 64
End: 2019-10-17

## 2019-10-17 VITALS
HEART RATE: 77 BPM | DIASTOLIC BLOOD PRESSURE: 85 MMHG | TEMPERATURE: 98.4 F | OXYGEN SATURATION: 100 % | HEIGHT: 66 IN | BODY MASS INDEX: 30.67 KG/M2 | SYSTOLIC BLOOD PRESSURE: 138 MMHG | WEIGHT: 190.8 LBS | RESPIRATION RATE: 17 BRPM

## 2019-10-17 DIAGNOSIS — E03.9 ACQUIRED HYPOTHYROIDISM: ICD-10-CM

## 2019-10-17 DIAGNOSIS — I10 ESSENTIAL HYPERTENSION: Primary | ICD-10-CM

## 2019-10-17 RX ORDER — LEVOTHYROXINE SODIUM 75 UG/1
75 TABLET ORAL
Qty: 90 TAB | Refills: 0 | Status: SHIPPED | OUTPATIENT
Start: 2019-10-17 | End: 2020-01-13 | Stop reason: SDUPTHER

## 2019-10-17 RX ORDER — AMLODIPINE BESYLATE 5 MG/1
5 TABLET ORAL DAILY
Qty: 90 TAB | Refills: 0 | Status: SHIPPED | OUTPATIENT
Start: 2019-10-17 | End: 2020-01-13 | Stop reason: SDUPTHER

## 2019-10-17 NOTE — PROGRESS NOTES
Subjective:     Chief Complaint   Patient presents with    Blood Pressure Check     2 month f/u    Thyroid Problem        She  is a 60y.o. year old female with hx of HTN, Hypothyroid is here for follow up. HTN: She is currently  Amlodipine 5 mg . BP has been running in 120s/80s range at home. She follows healthy lifestyle . Denies any chest pain, soa, cough. She mention that she notice some leg tightness in her right ankle  otherwise no med side effects.      Hypothyroid: She is on synthroid 75 mcg started about 3 1/2 months ago. Reports that she is tolerating the med fine. Compliant. Recent lab showed;    Lab Results   Component Value Date/Time    TSH 0.033 (L) 10/09/2019 11:57 AM    T4, Free 1.55 10/09/2019 11:57 AM     BMP is normal.      She notice some weight gain despite she maintain healthy lifestyle. Pertinent items are noted in HPI. Objective:     Vitals:    10/17/19 1427 10/17/19 1504   BP: 145/84 138/85   Pulse: 77    Resp: 17    Temp: 98.4 °F (36.9 °C)    TempSrc: Oral    SpO2: 100%    Weight: 190 lb 12.8 oz (86.5 kg)    Height: 5' 6\" (1.676 m)        Physical Examination: General appearance - alert, well appearing, and in no distress, oriented to person, place, and time and overweight  Mental status - alert, oriented to person, place, and time, normal mood, behavior, speech, dress, motor activity, and thought processes  Chest - clear to auscultation, no wheezes, rales or rhonchi, symmetric air entry  Heart - normal rate, regular rhythm, normal S1, S2, no murmurs, rubs, clicks or gallops  Extremities - mild right ankle edema.      Allergies   Allergen Reactions    Amoxicillin Hives      Social History     Socioeconomic History    Marital status: SINGLE     Spouse name: Not on file    Number of children: Not on file    Years of education: Not on file    Highest education level: Not on file   Tobacco Use    Smoking status: Former Smoker    Smokeless tobacco: Never Used   Substance and Sexual Activity    Alcohol use: Yes     Comment: social    Drug use: No    Sexual activity: Not Currently      Family History   Problem Relation Age of Onset    No Known Problems Mother     Cancer Father         Lung cancer    Diabetes Brother     Diabetes Brother     Breast Cancer Cousin       Past Surgical History:   Procedure Laterality Date    HX CYST INCISION AND DRAINAGE Left 2010    benign    HX FRACTURE TX Right     HX GYN      tubal ligation. Fibroid removal.    HX HYSTERECTOMY  03/2016      Past Medical History:   Diagnosis Date    Hypercholesterolemia     Hypertension     Menopause     Thyroid disease       Current Outpatient Medications   Medication Sig Dispense Refill    levothyroxine (SYNTHROID) 75 mcg tablet Take 1 Tab by mouth Daily (before breakfast). 30 Tab 2    amLODIPine (NORVASC) 5 mg tablet Take 1 Tab by mouth daily. 30 Tab 2        Assessment/ Plan:   Diagnoses and all orders for this visit:    1. Essential hypertension  -   BP is stable with  amLODIPine (NORVASC) 5 mg tablet; Take 1 Tab by mouth daily. 2. Acquired hypothyroidism  -   Slightly abnormal thyroid level. No med change. Continue   levothyroxine (SYNTHROID) 75 mcg tablet; Take 1 Tab by mouth Daily (before breakfast). Medication risks/benefits/costs/interactions/alternatives discussed with patient. Advised patient to call back or return to office if symptoms worsen/change/persist. If patient cannot reach us or should anything more severe/urgent arise he/she should proceed directly to the nearest emergency department. Discussed expected course/resolution/complications of diagnosis in detail with patient. Patient given a written after visit summary which includes her diagnoses, current medications and vitals. Patient expressed understanding with the diagnosis and plan.

## 2019-12-19 ENCOUNTER — HOSPITAL ENCOUNTER (OUTPATIENT)
Dept: MAMMOGRAPHY | Age: 64
Discharge: HOME | End: 2019-12-19
Attending: OBSTETRICS & GYNECOLOGY
Payer: COMMERCIAL

## 2019-12-19 DIAGNOSIS — Z12.31 VISIT FOR SCREENING MAMMOGRAM: ICD-10-CM

## 2019-12-19 PROCEDURE — 77063 BREAST TOMOSYNTHESIS BI: CPT

## 2020-01-10 DIAGNOSIS — E03.9 ACQUIRED HYPOTHYROIDISM: ICD-10-CM

## 2020-01-10 DIAGNOSIS — I10 ESSENTIAL HYPERTENSION: Primary | ICD-10-CM

## 2020-01-11 LAB
BUN SERPL-MCNC: 7 MG/DL (ref 8–27)
BUN/CREAT SERPL: 8 (ref 12–28)
CALCIUM SERPL-MCNC: 9.3 MG/DL (ref 8.7–10.3)
CHLORIDE SERPL-SCNC: 104 MMOL/L (ref 96–106)
CO2 SERPL-SCNC: 21 MMOL/L (ref 20–29)
CREAT SERPL-MCNC: 0.83 MG/DL (ref 0.57–1)
GLUCOSE SERPL-MCNC: 107 MG/DL (ref 65–99)
POTASSIUM SERPL-SCNC: 3.9 MMOL/L (ref 3.5–5.2)
SODIUM SERPL-SCNC: 141 MMOL/L (ref 134–144)
T4 FREE SERPL-MCNC: 1.31 NG/DL (ref 0.82–1.77)
TSH SERPL DL<=0.005 MIU/L-ACNC: 0.24 UIU/ML (ref 0.45–4.5)

## 2020-01-13 ENCOUNTER — OFFICE VISIT (OUTPATIENT)
Dept: PRIMARY CARE CLINIC | Age: 65
End: 2020-01-13

## 2020-01-13 VITALS
BODY MASS INDEX: 31.72 KG/M2 | HEART RATE: 74 BPM | WEIGHT: 197.4 LBS | SYSTOLIC BLOOD PRESSURE: 131 MMHG | DIASTOLIC BLOOD PRESSURE: 81 MMHG | RESPIRATION RATE: 18 BRPM | HEIGHT: 66 IN | OXYGEN SATURATION: 99 % | TEMPERATURE: 98.1 F

## 2020-01-13 DIAGNOSIS — I10 ESSENTIAL HYPERTENSION: Primary | ICD-10-CM

## 2020-01-13 DIAGNOSIS — E03.9 ACQUIRED HYPOTHYROIDISM: ICD-10-CM

## 2020-01-13 RX ORDER — AMLODIPINE BESYLATE 5 MG/1
5 TABLET ORAL DAILY
Qty: 90 TAB | Refills: 0 | Status: SHIPPED | OUTPATIENT
Start: 2020-01-13 | End: 2020-06-16 | Stop reason: SDUPTHER

## 2020-01-13 RX ORDER — ESTRADIOL 0.05 MG/D
FILM, EXTENDED RELEASE TRANSDERMAL
COMMUNITY
Start: 2019-12-18 | End: 2022-08-18

## 2020-01-13 RX ORDER — LEVOTHYROXINE SODIUM 75 UG/1
75 TABLET ORAL
Qty: 90 TAB | Refills: 0 | Status: SHIPPED | OUTPATIENT
Start: 2020-01-13 | End: 2020-06-16 | Stop reason: SDUPTHER

## 2020-01-13 RX ORDER — ESTRADIOL 0.1 MG/G
CREAM VAGINAL
COMMUNITY
Start: 2019-12-19 | End: 2022-08-18

## 2020-01-13 NOTE — PROGRESS NOTES
Subjective:     Chief Complaint   Patient presents with    Thyroid Problem     3 month f/u    Hypertension        She  is a 60y.o. year old female with hx of HTN, Hypothyroid is here for follow up. Lab was already done.       HTN: She is currently  Amlodipine 5 mg . BP well controlled. She follows healthy lifestyle . Denies any chest pain, soa, cough.      Hypothyroid: She is on synthroid 75 mcg compliant. Reports that she is tolerating the med fine. Compliant.      Recent lab showed;    Lab Results   Component Value Date/Time    GFR est non-AA 76 01/10/2020 10:29 AM    GFR est AA 87 01/10/2020 10:29 AM    Creatinine 0.83 01/10/2020 10:29 AM    BUN 7 (L) 01/10/2020 10:29 AM    Sodium 141 01/10/2020 10:29 AM    Potassium 3.9 01/10/2020 10:29 AM    Chloride 104 01/10/2020 10:29 AM    CO2 21 01/10/2020 10:29 AM     Lab Results   Component Value Date/Time    TSH 0.244 (L) 01/10/2020 10:29 AM    T4, Free 1.31 01/10/2020 10:29 AM          Pertinent items are noted in HPI.   Objective:     Vitals:    01/13/20 0805   Resp: 18   Weight: 197 lb 6.4 oz (89.5 kg)   Height: 5' 6\" (1.676 m)       Physical Examination: General appearance - alert, well appearing, and in no distress, oriented to person, place, and time and overweight  Mental status - alert, oriented to person, place, and time, normal mood, behavior, speech, dress, motor activity, and thought processes  Chest - clear to auscultation, no wheezes, rales or rhonchi, symmetric air entry  Heart - normal rate, regular rhythm, normal S1, S2, no murmurs, rubs, clicks or gallops    Allergies   Allergen Reactions    Amoxicillin Hives      Social History     Socioeconomic History    Marital status: SINGLE     Spouse name: Not on file    Number of children: Not on file    Years of education: Not on file    Highest education level: Not on file   Tobacco Use    Smoking status: Former Smoker    Smokeless tobacco: Never Used   Substance and Sexual Activity    Alcohol use: Yes     Comment: social    Drug use: No    Sexual activity: Not Currently      Family History   Problem Relation Age of Onset    No Known Problems Mother     Cancer Father         Lung cancer    Diabetes Brother     Diabetes Brother     Breast Cancer Cousin       Past Surgical History:   Procedure Laterality Date    HX CYST INCISION AND DRAINAGE Left 2010    benign    HX FRACTURE TX Right     HX GYN      tubal ligation. Fibroid removal.    HX HYSTERECTOMY  03/2016      Past Medical History:   Diagnosis Date    Hypercholesterolemia     Hypertension     Menopause     Thyroid disease       Current Outpatient Medications   Medication Sig Dispense Refill    estradiol (ESTRACE) 0.01 % (0.1 mg/gram) vaginal cream APPLY 0.5 (ONE HALF) GRAM VAGINALLY TWICE WEEKLY      estradiol (VIVELLE) 0.05 mg/24 hr APPLY 1 PATCH TOPICALLY TWICE A WEEK. REMOVE [facility] EACH TIME.  amLODIPine (NORVASC) 5 mg tablet Take 1 Tab by mouth daily. 90 Tab 0    levothyroxine (SYNTHROID) 75 mcg tablet Take 1 Tab by mouth Daily (before breakfast). 90 Tab 0        Assessment/ Plan:   Diagnoses and all orders for this visit:    1. Essential hypertension  -     BP well controlled with amLODIPine (NORVASC) 5 mg tablet; Take 1 Tab by mouth daily. 2. Acquired hypothyroidism  -    TSH level is slightly abnormal. Will continue to monitor . Continue  levothyroxine (SYNTHROID) 75 mcg tablet; Take 1 Tab by mouth Daily (before breakfast). Medication risks/benefits/costs/interactions/alternatives discussed with patient. Advised patient to call back or return to office if symptoms worsen/change/persist. If patient cannot reach us or should anything more severe/urgent arise he/she should proceed directly to the nearest emergency department. Discussed expected course/resolution/complications of diagnosis in detail with patient.   Patient given a written after visit summary which includes her diagnoses, current medications and vitals. Patient expressed understanding with the diagnosis and plan. Follow-up and Dispositions    · Return in about 23 weeks (around 6/22/2020) for thyroid, complete physical  and fasting blood work., have fasting blood work done 2-3 days prior. Katarzyna Nguyen

## 2020-05-27 ENCOUNTER — TELEPHONE (OUTPATIENT)
Dept: PRIMARY CARE CLINIC | Age: 65
End: 2020-05-27

## 2020-05-27 NOTE — TELEPHONE ENCOUNTER
Pt has physical scheduled for June 22nd and would like to come a few days before to do labwork. Please put in order for labs.

## 2020-05-27 NOTE — TELEPHONE ENCOUNTER
Would you like to place order for labs in patent chart. Patient upcoming appt is for June 22nd.  Please advise

## 2020-05-28 NOTE — TELEPHONE ENCOUNTER
I don't think we have yet decided to have physical in office yet. She can wait for the physical. She will be due for her thyroid, cholesterol check up  though. She can have blood drawn 2-3 days prior to the appointment. Let me know if she okay with that.

## 2020-05-28 NOTE — TELEPHONE ENCOUNTER
Message has been conveyed to patient per Dr. Sarmad Pereyra.  Patient has verbalized understanding and no further questions were asked

## 2020-06-16 DIAGNOSIS — I10 ESSENTIAL HYPERTENSION: ICD-10-CM

## 2020-06-16 DIAGNOSIS — E03.9 ACQUIRED HYPOTHYROIDISM: ICD-10-CM

## 2020-06-16 RX ORDER — AMLODIPINE BESYLATE 5 MG/1
5 TABLET ORAL DAILY
Qty: 90 TAB | Refills: 0 | Status: SHIPPED | OUTPATIENT
Start: 2020-06-16 | End: 2020-07-29 | Stop reason: SDUPTHER

## 2020-06-16 RX ORDER — LEVOTHYROXINE SODIUM 75 UG/1
75 TABLET ORAL
Qty: 90 TAB | Refills: 0 | Status: SHIPPED | OUTPATIENT
Start: 2020-06-16 | End: 2020-07-29 | Stop reason: SDUPTHER

## 2020-07-17 ENCOUNTER — TELEPHONE (OUTPATIENT)
Dept: PRIMARY CARE CLINIC | Age: 65
End: 2020-07-17

## 2020-07-17 DIAGNOSIS — I10 ESSENTIAL HYPERTENSION: ICD-10-CM

## 2020-07-17 DIAGNOSIS — E78.5 HYPERLIPIDEMIA LDL GOAL <100: ICD-10-CM

## 2020-07-17 DIAGNOSIS — Z00.00 WELL ADULT ON ROUTINE HEALTH CHECK: Primary | ICD-10-CM

## 2020-07-17 DIAGNOSIS — E03.9 ACQUIRED HYPOTHYROIDISM: ICD-10-CM

## 2020-07-17 NOTE — TELEPHONE ENCOUNTER
Pt coming in for routine labs on Friday, July 24, 2020 08:15 AM.    Pt is scheduled for annual on Wednesday, July 29, 2020 09:00. Please place lab orders.

## 2020-07-26 LAB
ALBUMIN SERPL-MCNC: 4.2 G/DL (ref 3.8–4.8)
ALBUMIN/GLOB SERPL: 1.8 {RATIO} (ref 1.2–2.2)
ALP SERPL-CCNC: 113 IU/L (ref 39–117)
ALT SERPL-CCNC: 14 IU/L (ref 0–32)
AST SERPL-CCNC: 18 IU/L (ref 0–40)
BASOPHILS # BLD AUTO: 0 X10E3/UL (ref 0–0.2)
BASOPHILS NFR BLD AUTO: 0 %
BILIRUB SERPL-MCNC: 0.3 MG/DL (ref 0–1.2)
BUN SERPL-MCNC: 14 MG/DL (ref 8–27)
BUN/CREAT SERPL: 16 (ref 12–28)
CALCIUM SERPL-MCNC: 9.7 MG/DL (ref 8.7–10.3)
CHLORIDE SERPL-SCNC: 102 MMOL/L (ref 96–106)
CHOLEST SERPL-MCNC: 201 MG/DL (ref 100–199)
CO2 SERPL-SCNC: 25 MMOL/L (ref 20–29)
CREAT SERPL-MCNC: 0.9 MG/DL (ref 0.57–1)
EOSINOPHIL # BLD AUTO: 0.2 X10E3/UL (ref 0–0.4)
EOSINOPHIL NFR BLD AUTO: 3 %
ERYTHROCYTE [DISTWIDTH] IN BLOOD BY AUTOMATED COUNT: 13.6 % (ref 11.7–15.4)
EST. AVERAGE GLUCOSE BLD GHB EST-MCNC: 117 MG/DL
GLOBULIN SER CALC-MCNC: 2.4 G/DL (ref 1.5–4.5)
GLUCOSE SERPL-MCNC: 94 MG/DL (ref 65–99)
HBA1C MFR BLD: 5.7 % (ref 4.8–5.6)
HCT VFR BLD AUTO: 40.6 % (ref 34–46.6)
HDLC SERPL-MCNC: 54 MG/DL
HGB BLD-MCNC: 13.5 G/DL (ref 11.1–15.9)
IMM GRANULOCYTES # BLD AUTO: 0 X10E3/UL (ref 0–0.1)
IMM GRANULOCYTES NFR BLD AUTO: 0 %
INTERPRETIVE COMMENT, 330017: ABNORMAL
LDLC SERPL CALC-MCNC: 130 MG/DL (ref 0–99)
LYMPHOCYTES # BLD AUTO: 2.1 X10E3/UL (ref 0.7–3.1)
LYMPHOCYTES NFR BLD AUTO: 34 %
MCH RBC QN AUTO: 30.4 PG (ref 26.6–33)
MCHC RBC AUTO-ENTMCNC: 33.3 G/DL (ref 31.5–35.7)
MCV RBC AUTO: 91 FL (ref 79–97)
MONOCYTES # BLD AUTO: 0.4 X10E3/UL (ref 0.1–0.9)
MONOCYTES NFR BLD AUTO: 6 %
NEUTROPHILS # BLD AUTO: 3.3 X10E3/UL (ref 1.4–7)
NEUTROPHILS NFR BLD AUTO: 57 %
PLATELET # BLD AUTO: 277 X10E3/UL (ref 150–450)
POTASSIUM SERPL-SCNC: 4.3 MMOL/L (ref 3.5–5.2)
PROT SERPL-MCNC: 6.6 G/DL (ref 6–8.5)
RBC # BLD AUTO: 4.44 X10E6/UL (ref 3.77–5.28)
SODIUM SERPL-SCNC: 139 MMOL/L (ref 134–144)
T4 FREE SERPL-MCNC: 1.33 NG/DL (ref 0.82–1.77)
THYROPEROXIDASE AB SERPL-ACNC: 253 IU/ML (ref 0–34)
TRIGL SERPL-MCNC: 87 MG/DL (ref 0–149)
TSH SERPL DL<=0.005 MIU/L-ACNC: 4.94 UIU/ML (ref 0.45–4.5)
VLDLC SERPL CALC-MCNC: 17 MG/DL (ref 5–40)
WBC # BLD AUTO: 6 X10E3/UL (ref 3.4–10.8)

## 2020-07-29 ENCOUNTER — OFFICE VISIT (OUTPATIENT)
Dept: PRIMARY CARE CLINIC | Age: 65
End: 2020-07-29

## 2020-07-29 VITALS
HEART RATE: 75 BPM | SYSTOLIC BLOOD PRESSURE: 138 MMHG | WEIGHT: 195 LBS | BODY MASS INDEX: 31.34 KG/M2 | HEIGHT: 66 IN | DIASTOLIC BLOOD PRESSURE: 85 MMHG | OXYGEN SATURATION: 98 % | RESPIRATION RATE: 16 BRPM | TEMPERATURE: 98.1 F

## 2020-07-29 DIAGNOSIS — I10 ESSENTIAL HYPERTENSION: ICD-10-CM

## 2020-07-29 DIAGNOSIS — E78.5 HYPERLIPIDEMIA, UNSPECIFIED HYPERLIPIDEMIA TYPE: ICD-10-CM

## 2020-07-29 DIAGNOSIS — E03.9 ACQUIRED HYPOTHYROIDISM: ICD-10-CM

## 2020-07-29 DIAGNOSIS — Z00.00 WELL ADULT ON ROUTINE HEALTH CHECK: Primary | ICD-10-CM

## 2020-07-29 DIAGNOSIS — R73.03 PREDIABETES: ICD-10-CM

## 2020-07-29 RX ORDER — LEVOTHYROXINE SODIUM 75 UG/1
75 TABLET ORAL
Qty: 30 TAB | Refills: 0 | Status: SHIPPED | OUTPATIENT
Start: 2020-07-29 | End: 2020-10-15 | Stop reason: SDUPTHER

## 2020-07-29 RX ORDER — AMLODIPINE BESYLATE 5 MG/1
5 TABLET ORAL DAILY
Qty: 90 TAB | Refills: 1 | Status: SHIPPED | OUTPATIENT
Start: 2020-07-29 | End: 2020-10-15 | Stop reason: SDUPTHER

## 2020-07-29 NOTE — PROGRESS NOTES
Gely Corral is a 60 y.o. female    Chief Complaint   Patient presents with    Complete Physical    Thyroid Problem     f/u       1. Have you been to the ER, urgent care clinic since your last visit? Hospitalized since your last visit? No    2. Have you seen or consulted any other health care providers outside of the [address] [place] [address] since your last visit? Include any pap smears or colon screening. No    No flowsheet data found. There are no preventive care reminders to display for this patient.

## 2020-07-29 NOTE — PROGRESS NOTES
Subjective:   60 y.o. female for Well Woman Check up as well as follow up on BP and thyroid. Labs are already done and reviewed with patient today. LDL level is much better. TSH level is slightly abnormal but over all its stable. A1c is 5.7, in prediabetic range. HTN: Well controlled with Amlodipine. Hypothyroid: She is compliant with Synthroid 75 mcg daily. Her gyne and breast care is done elsewhere by her Ob-Gyne physician. Patient Active Problem List   Diagnosis Code    Lymphadenopathy R59.1    Multinodular goiter E04.2    Post menopausal problems N95.9    Hyperlipidemia E78.5    HTN (hypertension) I10    Elevated blood pressure reading without diagnosis of hypertension R03.0    Acquired hypothyroidism E03.9     Current Outpatient Medications   Medication Sig Dispense Refill    amLODIPine (NORVASC) 5 mg tablet Take 1 Tab by mouth daily. 90 Tab 1    levothyroxine (SYNTHROID) 75 mcg tablet Take 1 Tab by mouth Daily (before breakfast). 30 Tab 0    estradiol (ESTRACE) 0.01 % (0.1 mg/gram) vaginal cream APPLY 0.5 (ONE HALF) GRAM VAGINALLY TWICE WEEKLY      estradiol (VIVELLE) 0.05 mg/24 hr APPLY 1 PATCH TOPICALLY TWICE A WEEK. REMOVE [facility] EACH TIME. Allergies   Allergen Reactions    Amoxicillin Hives     Past Medical History:   Diagnosis Date    Hypercholesterolemia     Hypertension     Menopause     Thyroid disease         Lab Results   Component Value Date/Time    WBC 6.0 07/24/2020 08:25 AM    HGB 13.5 07/24/2020 08:25 AM    HCT 40.6 07/24/2020 08:25 AM    PLATELET 655 25/09/6502 08:25 AM    MCV 91 07/24/2020 08:25 AM     Lab Results   Component Value Date/Time    Cholesterol, total 201 (H) 07/24/2020 08:25 AM    HDL Cholesterol 54 07/24/2020 08:25 AM    LDL, calculated 130 (H) 07/24/2020 08:25 AM    Triglyceride 87 07/24/2020 08:25 AM     Lab Results   Component Value Date/Time    ALT (SGPT) 14 07/24/2020 08:25 AM    Alk.  phosphatase 113 07/24/2020 08:25 AM    Bilirubin, total 0.3 07/24/2020 08:25 AM    Albumin 4.2 07/24/2020 08:25 AM    Protein, total 6.6 07/24/2020 08:25 AM    PLATELET 401 12/76/0130 08:25 AM     Lab Results   Component Value Date/Time    GFR est non-AA 69 07/24/2020 08:25 AM    GFR est AA 79 07/24/2020 08:25 AM    Creatinine 0.90 07/24/2020 08:25 AM    BUN 14 07/24/2020 08:25 AM    Sodium 139 07/24/2020 08:25 AM    Potassium 4.3 07/24/2020 08:25 AM    Chloride 102 07/24/2020 08:25 AM    CO2 25 07/24/2020 08:25 AM     Lab Results   Component Value Date/Time    TSH 4.940 (H) 07/24/2020 08:25 AM    TSH 0.244 (L) 01/10/2020 10:29 AM    T4, Free 1.31 01/10/2020 10:29 AM      Lab Results   Component Value Date/Time    Hemoglobin A1c 5.7 (H) 07/24/2020 08:25 AM         ROS: Feeling generally well. No TIA's or unusual headaches, no dysphagia. No prolonged cough. No dyspnea or chest pain on exertion. No abdominal pain, change in bowel habits, black or bloody stools. No urinary tract symptoms. No new or unusual musculoskeletal symptoms. Specific concerns today: refer to HPI. .    Objective: The patient appears well, alert, oriented x 3, in no distress. Visit Vitals  /75 (BP 1 Location: Left arm, BP Patient Position: Sitting)   Pulse 75   Temp 98.1 °F (36.7 °C) (Temporal)   Resp 16   Ht 5' 6\" (1.676 m)   Wt 195 lb (88.5 kg)   LMP 11/08/2010   SpO2 98%   BMI 31.47 kg/m²     ENT normal.  Neck supple. No adenopathy or thyromegaly. ALVARO. Lungs are clear, good air entry, no wheezes, rhonchi or rales. S1 and S2 normal, no murmurs, regular rate and rhythm. Abdomen soft without tenderness, guarding, mass or organomegaly. Extremities show no edema, normal peripheral pulses. Neurological is normal, no focal findings. Breast and Pelvic exams are deferred.     Assessment/Plan:   Well Woman  lose weight, increase physical activity, bring BP log to office visit, follow low fat diet, follow low salt diet, continue present plan, routine labs ordered, call if any problems ICD-10-CM ICD-9-CM    1. Acquired hypothyroidism  E03.9 244.9 levothyroxine (SYNTHROID) 75 mcg tablet      THYROID CASCADE PROFILE   2. Essential hypertension  I10 401.9 amLODIPine (NORVASC) 5 mg tablet   3. Well adult on routine health check  Z00.00 V70.0    4. Hyperlipidemia, unspecified hyperlipidemia type  E78.5 272.4      Diagnoses and all orders for this visit:    1. Well adult on routine health check      Lab results discussed with patient. Discussed diet and exercise. 2. Acquired hypothyroidism  -  Continue    levothyroxine (SYNTHROID) 75 mcg tablet; Take 1 Tab by mouth Daily (before breakfast). Follow up in 3 months.  -     THYROID CASCADE PROFILE; Future    3. Essential hypertension  -   Well controlled with  amLODIPine (NORVASC) 5 mg tablet; Take 1 Tab by mouth daily. 4. Hyperlipidemia, unspecified hyperlipidemia type      Over all cholesterol level is lot better. Continue work on diet and exercise. \     Will check level in 6 months. 5. Prediabetes      Discussed about diet and exercise. Follow-up and Dispositions    · Return in about 3 months (around 10/29/2020), or if symptoms worsen or fail to improve, for thyroid, Bring BP log book. .       current treatment plan is effective, no change in therapy  reviewed diet, exercise and weight control  reviewed medications and side effects in detail

## 2020-10-09 ENCOUNTER — TRANSCRIBE ORDER (OUTPATIENT)
Dept: SCHEDULING | Age: 65
End: 2020-10-09

## 2020-10-09 DIAGNOSIS — Z12.31 VISIT FOR SCREENING MAMMOGRAM: Primary | ICD-10-CM

## 2020-10-13 DIAGNOSIS — E03.9 ACQUIRED HYPOTHYROIDISM: ICD-10-CM

## 2020-10-14 LAB — TSH SERPL-ACNC: 1.09 UIU/ML (ref 0.45–4.5)

## 2020-10-15 ENCOUNTER — OFFICE VISIT (OUTPATIENT)
Dept: PRIMARY CARE CLINIC | Age: 65
End: 2020-10-15
Payer: COMMERCIAL

## 2020-10-15 VITALS
SYSTOLIC BLOOD PRESSURE: 139 MMHG | HEART RATE: 72 BPM | TEMPERATURE: 97.8 F | HEIGHT: 66 IN | DIASTOLIC BLOOD PRESSURE: 79 MMHG | RESPIRATION RATE: 15 BRPM | BODY MASS INDEX: 31.63 KG/M2 | OXYGEN SATURATION: 99 % | WEIGHT: 196.8 LBS

## 2020-10-15 DIAGNOSIS — E03.9 ACQUIRED HYPOTHYROIDISM: Primary | ICD-10-CM

## 2020-10-15 DIAGNOSIS — I10 ESSENTIAL HYPERTENSION: ICD-10-CM

## 2020-10-15 PROCEDURE — 99213 OFFICE O/P EST LOW 20 MIN: CPT | Performed by: FAMILY MEDICINE

## 2020-10-15 RX ORDER — AMLODIPINE BESYLATE 5 MG/1
5 TABLET ORAL DAILY
Qty: 90 TAB | Refills: 1 | Status: SHIPPED | OUTPATIENT
Start: 2020-10-15 | End: 2021-04-16 | Stop reason: SDUPTHER

## 2020-10-15 RX ORDER — LEVOTHYROXINE SODIUM 75 UG/1
75 TABLET ORAL
Qty: 90 TAB | Refills: 1 | Status: SHIPPED | OUTPATIENT
Start: 2020-10-15 | End: 2021-04-16 | Stop reason: SDUPTHER

## 2020-10-15 RX ORDER — ASPIRIN 81 MG/1
TABLET ORAL DAILY
COMMUNITY
End: 2022-08-18

## 2020-10-15 NOTE — PROGRESS NOTES
Subjective:     Chief Complaint   Patient presents with    Thyroid Problem     follow up    Hypertension     follow up        She  is a 63y.o. year old female is here for follow up on BP and thyroid. Labs are already done and reviewed with patient today. TSH level is normal.      HTN: Well controlled with Amlodipine. Hypothyroid: She is compliant with Synthroid 75 mcg daily. Lab Results   Component Value Date/Time    TSH 1.090 10/13/2020 09:05 AM    TSH 0.244 (L) 01/10/2020 10:29 AM    T4, Free 1.31 01/10/2020 10:29 AM         Pertinent items are noted in HPI. Objective:     Vitals:    10/15/20 1543   BP: 139/79   Pulse: 72   Resp: 15   Temp: 97.8 °F (36.6 °C)   TempSrc: Temporal   SpO2: 99%   Weight: 196 lb 12.8 oz (89.3 kg)   Height: 5' 6\" (1.676 m)       Physical Examination: General appearance - alert, well appearing, and in no distress, oriented to person, place, and time and overweight  Mental status - alert, oriented to person, place, and time, normal mood, behavior, speech, dress, motor activity, and thought processes  Chest - clear to auscultation, no wheezes, rales or rhonchi, symmetric air entry  Heart - normal rate, regular rhythm, normal S1, S2, no murmurs, rubs, clicks or gallops.     Allergies   Allergen Reactions    Amoxicillin Hives      Social History     Socioeconomic History    Marital status: SINGLE     Spouse name: Not on file    Number of children: Not on file    Years of education: Not on file    Highest education level: Not on file   Tobacco Use    Smoking status: Former Smoker     Packs/day: 0.50     Years: 20.00     Pack years: 10.00     Start date: 10/15/1979     Last attempt to quit: 10/15/1999     Years since quittin.0    Smokeless tobacco: Never Used   Substance and Sexual Activity    Alcohol use: Yes     Comment: social    Drug use: No    Sexual activity: Not Currently      Family History   Problem Relation Age of Onset    No Known Problems Mother     Cancer Father         Lung cancer    Diabetes Brother     Diabetes Brother     Breast Cancer Cousin       Past Surgical History:   Procedure Laterality Date    HX CYST INCISION AND DRAINAGE Left 2010    benign    HX FRACTURE TX Right     HX GYN      tubal ligation. Fibroid removal.    HX HYSTERECTOMY  03/2016      Past Medical History:   Diagnosis Date    Hypercholesterolemia     Hypertension     Menopause     Thyroid disease       Current Outpatient Medications   Medication Sig Dispense Refill    aspirin delayed-release 81 mg tablet Take  by mouth daily.  levothyroxine (SYNTHROID) 75 mcg tablet Take 1 Tab by mouth Daily (before breakfast). 90 Tab 1    amLODIPine (NORVASC) 5 mg tablet Take 1 Tab by mouth daily. 90 Tab 1    estradiol (ESTRACE) 0.01 % (0.1 mg/gram) vaginal cream APPLY 0.5 (ONE HALF) GRAM VAGINALLY TWICE WEEKLY      estradiol (VIVELLE) 0.05 mg/24 hr APPLY 1 PATCH TOPICALLY TWICE A WEEK. REMOVE [facility] EACH TIME. Assessment/ Plan:   Diagnoses and all orders for this visit:    1. Acquired hypothyroidism  -   Well controlled with  levothyroxine (SYNTHROID) 75 mcg tablet; Take 1 Tab by mouth Daily (before breakfast). 2. Essential hypertension  -   Well controlled with amLODIPine (NORVASC) 5 mg tablet; Take 1 Tab by mouth daily. Medication risks/benefits/costs/interactions/alternatives discussed with patient. Advised patient to call back or return to office if symptoms worsen/change/persist. If patient cannot reach us or should anything more severe/urgent arise he/she should proceed directly to the nearest emergency department. Discussed expected course/resolution/complications of diagnosis in detail with patient. Patient given a written after visit summary which includes her diagnoses, current medications and vitals. Patient expressed understanding with the diagnosis and plan.          Follow-up and Dispositions    · Return in about 6 months (around 4/15/2021) for Bring BP log book. , thyroid.

## 2020-10-15 NOTE — PROGRESS NOTES
1. Have you been to the ER, urgent care clinic since your last visit? Hospitalized since your last visit? [facility] 08/2020 for chest pain. 2. Have you seen or consulted any other health care providers outside of the [address] [place] [address] since your last visit? Include any pap smears or colon screening.  No     Chief Complaint   Patient presents with    Thyroid Problem     follow up    Hypertension     follow up     Not fasting

## 2020-12-31 ENCOUNTER — TRANSCRIBE ORDER (OUTPATIENT)
Dept: REGISTRATION | Age: 65
End: 2020-12-31

## 2020-12-31 ENCOUNTER — HOSPITAL ENCOUNTER (OUTPATIENT)
Dept: MAMMOGRAPHY | Age: 65
Discharge: HOME | End: 2020-12-31
Attending: OBSTETRICS & GYNECOLOGY
Payer: COMMERCIAL

## 2020-12-31 DIAGNOSIS — Z12.31 VISIT FOR SCREENING MAMMOGRAM: Primary | ICD-10-CM

## 2020-12-31 DIAGNOSIS — Z12.31 VISIT FOR SCREENING MAMMOGRAM: ICD-10-CM

## 2020-12-31 PROCEDURE — 77063 BREAST TOMOSYNTHESIS BI: CPT

## 2021-04-08 ENCOUNTER — TELEPHONE (OUTPATIENT)
Dept: PRIMARY CARE CLINIC | Age: 66
End: 2021-04-08

## 2021-04-08 DIAGNOSIS — I10 ESSENTIAL HYPERTENSION: ICD-10-CM

## 2021-04-08 DIAGNOSIS — R73.03 PREDIABETES: ICD-10-CM

## 2021-04-08 DIAGNOSIS — E78.5 MILD HYPERLIPIDEMIA: ICD-10-CM

## 2021-04-08 DIAGNOSIS — E03.9 ACQUIRED HYPOTHYROIDISM: Primary | ICD-10-CM

## 2021-04-12 NOTE — TELEPHONE ENCOUNTER
----- Message from Wendy Ray sent at 4/8/2021  5:08 PM EDT -----  Regarding: Dr. James Biggs  Pt missed a call from the practice and request for a call back. She states that she has some questions about her upcoming appt and having labs done. Best contact number is [phone].

## 2021-04-16 ENCOUNTER — OFFICE VISIT (OUTPATIENT)
Dept: PRIMARY CARE CLINIC | Age: 66
End: 2021-04-16
Payer: COMMERCIAL

## 2021-04-16 VITALS
DIASTOLIC BLOOD PRESSURE: 69 MMHG | TEMPERATURE: 97.9 F | BODY MASS INDEX: 32.69 KG/M2 | RESPIRATION RATE: 16 BRPM | WEIGHT: 203.4 LBS | HEART RATE: 72 BPM | OXYGEN SATURATION: 100 % | HEIGHT: 66 IN | SYSTOLIC BLOOD PRESSURE: 133 MMHG

## 2021-04-16 DIAGNOSIS — E03.9 ACQUIRED HYPOTHYROIDISM: Primary | ICD-10-CM

## 2021-04-16 DIAGNOSIS — I10 ESSENTIAL HYPERTENSION: ICD-10-CM

## 2021-04-16 DIAGNOSIS — E78.5 HYPERLIPIDEMIA, UNSPECIFIED HYPERLIPIDEMIA TYPE: ICD-10-CM

## 2021-04-16 DIAGNOSIS — R73.03 PREDIABETES: ICD-10-CM

## 2021-04-16 DIAGNOSIS — R06.83 SNORING: ICD-10-CM

## 2021-04-16 PROCEDURE — 99214 OFFICE O/P EST MOD 30 MIN: CPT | Performed by: FAMILY MEDICINE

## 2021-04-16 RX ORDER — LEVOTHYROXINE SODIUM 75 UG/1
75 TABLET ORAL
Qty: 90 TAB | Refills: 1 | Status: SHIPPED | OUTPATIENT
Start: 2021-04-16 | End: 2021-10-11

## 2021-04-16 RX ORDER — AMLODIPINE BESYLATE 5 MG/1
5 TABLET ORAL DAILY
Qty: 90 TAB | Refills: 3 | Status: SHIPPED | OUTPATIENT
Start: 2021-04-16 | End: 2021-07-26 | Stop reason: SDUPTHER

## 2021-04-16 NOTE — PROGRESS NOTES
Identified pt with two pt identifiers(name and ). Chief Complaint   Patient presents with    Hypertension     follow up      Thyroid Problem     follow up     Epigastric Pain    Sleep Apnea     wants to do sleep study         Vitals:    21 0817 21 0820   BP: (!) 145/76 133/69   Pulse: 72    Resp: 16    Temp: 97.9 °F (36.6 °C)    TempSrc: Oral    SpO2: 100%    Weight: 203 lb 6.4 oz (92.3 kg)    Height: 5' 6\" (1.676 m)    PainSc:   0 - No pain    LMP: 2010       There are no preventive care reminders to display for this patient. 1. Have you been to the ER, urgent care clinic since your last visit? Hospitalized since your last visit? ER in august for chest pain ([place])    2. Have you seen or consulted any other health care providers outside of the [address] [place] [address] since your last visit? Include any pap smears or colon screening.  Yes - Dr Cristiano Liu - for Pap and Mammo

## 2021-04-18 NOTE — PROGRESS NOTES
Subjective:     Chief Complaint   Patient presents with    Hypertension     follow up      Thyroid Problem     follow up     Epigastric Pain    Sleep Apnea     wants to do sleep study         She  is a 63y.o. year old female is here for follow up on BP and thyroid. Labs are already done and reviewed with patient today. TSH level is normal.      HTN: Well controlled with Amlodipine. Hypothyroid: She is compliant with Synthroid 75 mcg daily. Mild HLD noted. Last A1c was 5.7. Patient reports that she snores at night. Would like to get it evalauted. Has been eating healthy but not doing exercise she states. She denies nay chest pain, soa, cough. Pertinent items are noted in HPI.   Objective:     Vitals:    21 0817 21 0820   BP: (!) 145/76 133/69   Pulse: 72    Resp: 16    Temp: 97.9 °F (36.6 °C)    TempSrc: Oral    SpO2: 100%    Weight: 203 lb 6.4 oz (92.3 kg)    Height: 5' 6\" (1.676 m)        Physical Examination: General appearance - alert, well appearing, and in no distress, oriented to person, place, and time and overweight  Mental status - alert, oriented to person, place, and time, normal mood, behavior, speech, dress, motor activity, and thought processes  Chest - clear to auscultation, no wheezes, rales or rhonchi, symmetric air entry  Heart - normal rate, regular rhythm, normal S1, S2, no murmurs, rubs, clicks or gallops    Allergies   Allergen Reactions    Amoxicillin Hives      Social History     Socioeconomic History    Marital status:      Spouse name: Not on file    Number of children: Not on file    Years of education: Not on file    Highest education level: Not on file   Tobacco Use    Smoking status: Former Smoker     Packs/day: 0.50     Years: 20.00     Pack years: 10.00     Start date: 10/15/1979     Quit date: 10/15/1999     Years since quittin.5    Smokeless tobacco: Never Used   Substance and Sexual Activity    Alcohol use: Yes     Comment: social    Drug use: No    Sexual activity: Not Currently      Family History   Problem Relation Age of Onset    No Known Problems Mother     Cancer Father         Lung cancer    Diabetes Brother     Diabetes Brother     Breast Cancer Cousin       Past Surgical History:   Procedure Laterality Date    HX CYST INCISION AND DRAINAGE Left 2010    benign    HX FRACTURE TX Right     HX GYN      tubal ligation. Fibroid removal.    HX HYSTERECTOMY  03/2016      Past Medical History:   Diagnosis Date    Hypercholesterolemia     Hypertension     Menopause     Thyroid disease       Current Outpatient Medications   Medication Sig Dispense Refill    levothyroxine (SYNTHROID) 75 mcg tablet Take 1 Tab by mouth Daily (before breakfast). 90 Tab 1    amLODIPine (NORVASC) 5 mg tablet Take 1 Tab by mouth daily. 90 Tab 3    aspirin delayed-release 81 mg tablet Take  by mouth daily.  estradiol (ESTRACE) 0.01 % (0.1 mg/gram) vaginal cream APPLY 0.5 (ONE HALF) GRAM VAGINALLY TWICE WEEKLY      estradiol (VIVELLE) 0.05 mg/24 hr APPLY 1 PATCH TOPICALLY TWICE A WEEK. REMOVE [facility] EACH TIME. Assessment/ Plan:   Diagnoses and all orders for this visit:    1. Acquired hypothyroidism  -   Well controlled with  levothyroxine (SYNTHROID) 75 mcg tablet; Take 1 Tab by mouth Daily (before breakfast). 2. Essential hypertension  -  Well controlled with  amLODIPine (NORVASC) 5 mg tablet; Take 1 Tab by mouth daily. 3. Hyperlipidemia, unspecified hyperlipidemia type      Cholesterol level is still above normal range. Encouraged to work on diet and exercise. Lab Results   Component Value Date/Time    LDL, calculated 132.6 (H) 04/12/2021 07:53 AM       4. Prediabetes-         A1c slightly went up from last time. Continue work on diet and exercise.   Lab Results   Component Value Date/Time    Hemoglobin A1c 5.8 (H) 04/12/2021 07:53 AM            5. Snoring  -     SLEEP MEDICINE REFERRAL              Counseled on diet and exercise. Medication risks/benefits/costs/interactions/alternatives discussed with patient. Advised patient to call back or return to office if symptoms worsen/change/persist. If patient cannot reach us or should anything more severe/urgent arise he/she should proceed directly to the nearest emergency department. Discussed expected course/resolution/complications of diagnosis in detail with patient. Patient given a written after visit summary which includes her diagnoses, current medications and vitals. Patient expressed understanding with the diagnosis and plan. Follow-up and Dispositions    · Return in about 6 months (around 10/16/2021), or if symptoms worsen or fail to improve.

## 2021-05-26 ENCOUNTER — VIRTUAL VISIT (OUTPATIENT)
Dept: SLEEP MEDICINE | Age: 66
End: 2021-05-26
Payer: COMMERCIAL

## 2021-05-26 ENCOUNTER — TELEPHONE (OUTPATIENT)
Dept: SLEEP MEDICINE | Age: 66
End: 2021-05-26

## 2021-05-26 VITALS
SYSTOLIC BLOOD PRESSURE: 149 MMHG | WEIGHT: 198 LBS | BODY MASS INDEX: 31.82 KG/M2 | DIASTOLIC BLOOD PRESSURE: 80 MMHG | TEMPERATURE: 98.9 F | HEIGHT: 66 IN

## 2021-05-26 DIAGNOSIS — G47.33 OBSTRUCTIVE SLEEP APNEA (ADULT) (PEDIATRIC): Primary | ICD-10-CM

## 2021-05-26 DIAGNOSIS — I10 ESSENTIAL HYPERTENSION: ICD-10-CM

## 2021-05-26 PROCEDURE — 99204 OFFICE O/P NEW MOD 45 MIN: CPT | Performed by: INTERNAL MEDICINE

## 2021-05-26 NOTE — PATIENT INSTRUCTIONS
217 [facility] [place]. 1668 [facility], [address] Tel.  [phone] Fax. 100 [facility] [address] [place], [address] Tel.  [phone] Fax. [phone] 9250 [place] Vicky Gibson Tel.  [phone] Fax. [phone] Sleep Apnea: After Your Visit Your Care Instructions Sleep apnea occurs when you frequently stop breathing for 10 seconds or longer during sleep. It can be mild to severe, based on the number of times per hour that you stop breathing or have slowed breathing. Blocked or narrowed airways in your nose, mouth, or throat can cause sleep apnea. Your airway can become blocked when your throat muscles and tongue relax during sleep. Sleep apnea is common, occurring in 1 out of 20 individuals. Individuals having any of the following characteristics should be evaluated and treated right away due to high risk and detrimental consequences from untreated sleep apnea: 
1. Obesity 2. Congestive Heart failure 3. Atrial Fibrillation 4. Uncontrolled Hypertension 5. Type II Diabetes 6. Night-time Arrhythmias 7. Stroke 8. Pulmonary Hypertension 9. High-risk Driving Populations (pilots, truck drivers, etc.) 10. Patients Considering Weight-loss Surgery How do you know you have sleep apnea? You probably have sleep apnea if you answer 'yes' to 3 or more of the following questions: S - Have you been told that you Snore? T - Are you often Tired during the day? O - Has anyone Observed you stop breathing while sleeping? P- Do you have (or are being treated for) high blood Pressure? B - Are you obese (Body Mass Index > 35)? A - Is your Age 48years old or older? N - Is your Neck size greater than 16 inches? G - Are you male Gender? A sleep physician can prescribe a breathing device that prevents tissues in the throat from blocking your airway.  Or your doctor may recommend using a dental device (oral breathing device) to help keep your airway open. In some cases, surgery may be needed to remove enlarged tissues in the throat. Follow-up care is a key part of your treatment and safety. Be sure to make and go to all appointments, and call your doctor if you are having problems. It's also a good idea to know your test results and keep a list of the medicines you take. How can you care for yourself at home? · Lose weight, if needed. It may reduce the number of times you stop breathing or have slowed breathing. · Go to bed at the same time every night. · Sleep on your side. It may stop mild apnea. If you tend to roll onto your back, sew a pocket in the back of your pajama top. Put a tennis ball into the pocket, and stitch the pocket shut. This will help keep you from sleeping on your back. · Avoid alcohol and medicines such as sleeping pills and sedatives before bed. · Do not smoke. Smoking can make sleep apnea worse. If you need help quitting, talk to your doctor about stop-smoking programs and medicines. These can increase your chances of quitting for good. · Prop up the head of your bed 4 to 6 inches by putting bricks under the legs of the bed. · Treat breathing problems, such as a stuffy nose, caused by a cold or allergies. · Use a continuous positive airway pressure (CPAP) breathing machine if lifestyle changes do not help your apnea and your doctor recommends it. The machine keeps your airway from closing when you sleep. · If CPAP does not help you, ask your doctor whether you should try other breathing machines. A bilevel positive airway pressure machine has two types of air pressureâone for breathing in and one for breathing out. Another device raises or lowers air pressure as needed while you breathe. · If your nose feels dry or bleeds when using one of these machines, talk with your doctor about increasing moisture in the air. A humidifier may help.  
· If your nose is runny or stuffy from using a breathing machine, talk with your doctor about using decongestants or a corticosteroid nasal spray. When should you call for help? Watch closely for changes in your health, and be sure to contact your doctor if: 
· You still have sleep apnea even though you have made lifestyle changes. · You are thinking of trying a device such as CPAP. · You are having problems using a CPAP or similar machine. Where can you learn more? Go to Veenome. Enter U513 in the search box to learn more about \"Sleep Apnea: After Your Visit. \"  
© 3483-4278 Healthwise, Incorporated. Care instructions adapted under license by [facility] [employer] (which disclaims liability or warranty for this information). This care instruction is for use with your licensed healthcare professional. If you have questions about a medical condition or this instruction, always ask your healthcare professional. Isak Whitmore any warranty or liability for your use of this information. PROPER SLEEP HYGIENE What to avoid · Do not have drinks with caffeine, such as coffee or black tea, for 8 hours before bed. · Do not smoke or use other types of tobacco near bedtime. Nicotine is a stimulant and can keep you awake. · Avoid drinking alcohol late in the evening, because it can cause you to wake in the middle of the night. · Do not eat a big meal close to bedtime. If you are hungry, eat a light snack. · Do not drink a lot of water close to bedtime, because the need to urinate may wake you up during the night. · Do not read or watch TV in bed. Use the bed only for sleeping and sexual activity. What to try · Go to bed at the same time every night, and wake up at the same time every morning. Do not take naps during the day. · Keep your bedroom quiet, dark, and cool. · Get regular exercise, but not within 3 to 4 hours of your bedtime. Deejay Alejo · Sleep on a comfortable pillow and mattress.  
· If watching the clock makes you anxious, turn it facing away from you so you cannot see the time. · If you worry when you lie down, start a worry book. Well before bedtime, write down your worries, and then set the book and your concerns aside. · Try meditation or other relaxation techniques before you go to bed. · If you cannot fall asleep, get up and go to another room until you feel sleepy. Do something relaxing. Repeat your bedtime routine before you go to bed again. · Make your house quiet and calm about an hour before bedtime. Turn down the lights, turn off the TV, log off the computer, and turn down the volume on music. This can help you relax after a busy day. Drowsy Driving The [employer] cites drowsiness as a causing factor in more than 222,457 police reported crashes annually, resulting in 76,000 injuries and 1,500 deaths. Other surveys suggest 55% of people polled have driven while drowsy in the past year, 23% had fallen asleep but not crashed, 3% crashed, and 2% had and accident due to drowsy driving. Who is at risk? Young Drivers: One study of drowsy driving accidents states that 55% of the drivers were under 25 years. Of those, 75% were male. Shift Workers and Travelers: People who work overnight or travel across time zones frequently are at higher risk of experiencing Circadian Rhythm Disorders. They are trying to work and function when their body is programed to sleep. Sleep Deprived: Lack of sleep has a serious impact on your ability to pay attention or focus on a task. Consistently getting less than the average of 8 hours your body needs creates partial or cumulative sleep deprivation. Untreated Sleep Disorders: Sleep Apnea, Narcolepsy, R.L.S., and other sleep disorders (untreated) prevent a person from getting enough restful sleep. This leads to excessive daytime sleepiness and increases the risk for drowsy driving accidents by up to 7 times.  
Medications / Alcohol: Even over the counter medications can cause drowsiness. Medications that impair a drivers attention should have a warning label. Alcohol naturally makes you sleepy and on its own can cause accidents. Combined with excessive drowsiness its effects are amplified. Signs of Drowsy Driving: * You don't remember driving the last few miles * You may drift out of your lui * You are unable to focus and your thoughts wander * You may yawn more often than normal 
 * You have difficulty keeping your eyes open / nodding off * Missing traffic signs, speeding, or tailgating Prevention-  
Good sleep hygiene, lifestyle and behavioral choices have the most impact on drowsy driving. There is no substitute for sleep and the average person requires 8 hours nightly. If you find yourself driving drowsy, stop and sleep. Consider the sleep hygiene tips provided during your visit as well. Medication Refill Policy: Refills for all medications require 1 week advance notice. Please have your pharmacy fax a refill request. We are unable to fax, or call in \"controled substance\" medications and you will need to pick these prescriptions up from our office. [employer] Activation Thank you for requesting access to [employer]. Please follow the instructions below to securely access and download your online medical record. [employer] allows you to send messages to your doctor, view your test results, renew your prescriptions, schedule appointments, and more. How Do I Sign Up? 1. In your internet browser, go to [email or web address]. 2. Click on the First Time User? Click Here link in the Sign In box. You will see the New Member Sign Up page. 3. Enter your [employer] Access Code exactly as it appears below. You will not need to use this code after youve completed the sign-up process. If you do not sign up before the expiration date, you must request a new code. [employer] Access Code: Activation code not generated Current [employer] Status: Active (This is the date your Ymagis access code will ) 4. Enter the last four digits of your Social Security Number (xxxx) and Date of Birth (mm/dd/yyyy) as indicated and click Submit. You will be taken to the next sign-up page. 5. Create a Ymagis ID. This will be your Ymagis login ID and cannot be changed, so think of one that is secure and easy to remember. 6. Create a Ymagis password. You can change your password at any time. 7. Enter your Password Reset Question and Answer. This can be used at a later time if you forget your password. 8. Enter your e-mail address. You will receive e-mail notification when new information is available in [address] 9. Click Sign Up. You can now view and download portions of your medical record. 10. Click the Download Summary menu link to download a portable copy of your medical information. Additional Information If you have questions, please call [phone]. Remember, Ymagis is NOT to be used for urgent needs. For medical emergencies, dial 911.

## 2021-05-26 NOTE — PROGRESS NOTES
217 [facility] [place]. [place], [address]  Tel.  [phone]  Fax. 100 [facility] 60  [place], [address] [facility]  Tel.  [phone]  Fax. [phone] 9250 [facility] Vicky Husain  Tel.  [phone]  Fax. [phone]         Subjective:        Telemedicine visit performed with verbal consent of the patient. Patient called and identity confirmed with 2 patient identifers    Patient was seen at home outside  Dakota Willams is a 63 y.o. female who was seen by synchronous (real-time) audio-video technology on 5/26/2021. Consent:  She and/or her healthcare decision maker is aware that this patient-initiated Telehealth encounter is the equivalent to a face to face encounter in the sleep disorder center and has provided verbal consent to proceed: Yes    I was at home while conducting this encounter. Dakota Willams is an 63 y.o. female referred for evaluation for a sleep disorder. She complains of snoring, periods of not breathing (noted by her children when they are visiting) associated with excessive daytime sleepiness. Symptoms began several months ago, gradually worsening since that time. She usually can fall asleep in 30-60 minutes. Family or house members note snoring, periods of not breathing. She denies falling asleep while at work, driving. Dakota Willams does wake up frequently at night. She is bothered by waking up too early and left unable to get back to sleep. She actually sleeps about 6 hours at night and wakes up about 3 times during the night. She does not work shifts:  . Caitlin Harding indicates she does get too little sleep at night. Her bedtime is 2200. She awakens at 0600. She does not take naps. She takes   naps a week lasting  . She has the following observed behaviors: Loud snoring, Light snoring, Twitching of legs or feet, Pauses in breathing, Grinding teeth, Sleep talking; Other (use comments).   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 Allergies   Allergen Reactions    Amoxicillin Hives         Current Outpatient Medications:     levothyroxine (SYNTHROID) 75 mcg tablet, Take 1 Tab by mouth Daily (before breakfast). , Disp: 90 Tab, Rfl: 1    amLODIPine (NORVASC) 5 mg tablet, Take 1 Tab by mouth daily. , Disp: 90 Tab, Rfl: 3    aspirin delayed-release 81 mg tablet, Take  by mouth daily. , Disp: , Rfl:     estradiol (ESTRACE) 0.01 % (0.1 mg/gram) vaginal cream, APPLY 0.5 (ONE HALF) GRAM VAGINALLY TWICE WEEKLY, Disp: , Rfl:     estradiol (VIVELLE) 0.05 mg/24 hr, APPLY 1 PATCH TOPICALLY TWICE A WEEK. REMOVE [facility] EACH TIME. (Patient not taking: Reported on 5/26/2021), Disp: , Rfl:      She  has a past medical history of Hypercholesterolemia, Hypertension, Menopause, and Thyroid disease. She also has no past medical history of Asthma, Calculus of kidney, or Chronic obstructive pulmonary disease ([facility] [address] She  has a past surgical history that includes hx gyn; hx fracture tx (Right); hx cyst incision and drainage (Left, 2010); and hx hysterectomy (03/2016). She family history includes Breast Cancer in her cousin; Cancer in her father; Diabetes in her brother and brother; No Known Problems in her mother. She  reports that she quit smoking about 21 years ago. She started smoking about 41 years ago. She has a 10.00 pack-year smoking history. She has never used smokeless tobacco. She reports current alcohol use. She reports that she does not use drugs.      Review of Systems:  Constitutional: +No significant weight loss or weight gain  Eyes:  No blurred vision  CVS:  No significant chest pain  Pulm:  No significant shortness of breath  GI:  No significant nausea or vomiting, +GERD (recently started taking pepcid OTC)  :  + significant nocturia  Musculoskeletal:  No significant joint pain at night  Skin:  No significant rashes  Neuro:  No significant dizziness   Psych:  No active mood issues    Sleep Review of Systems: notable for some difficulty falling asleep; +frequent awakenings at night;  + dreaming noted; no nightmares ; some early morning headaches; no significant memory problems      Objective:     Visit Vitals  BP (!) 149/80   Temp 98.9 °F (37.2 °C)   Ht 5' 6\" (1.676 m)   Wt 198 lb (89.8 kg)   LMP 11/08/2010   BMI 31.96 kg/m²         Vital Signs: (As obtained by patient/caregiver at home)        Constitutional: [x] Appears well-developed and well-nourished [x] No apparent distress      [] Abnormal -     Mental status: [x] Alert and awake  [x] Oriented to person/place/time [x] Able to follow commands    [] Abnormal -     Eyes:   EOM    [x]  Normal    [] Abnormal -   Sclera  [x]  Normal    [] Abnormal -          Discharge [x]  None visible   [] Abnormal -     HENT: [x] Normocephalic, atraumatic  [] Abnormal -   [x] Mouth/Throat: Mucous membranes are moist             External Ears [x] Normal  [] Abnormal -    Neck: [x] No visualized mass [] Abnormal -     Pulmonary/Chest: [x] Respiratory effort normal   [x] No visualized signs of difficulty breathing or respiratory distress        [] Abnormal -       Neurological:        [x] No Facial Asymmetry (Cranial nerve 7 motor function) (limited exam due to video visit)          [x] No gaze palsy        [] Abnormal -          Skin:        [x] No significant exanthematous lesions or discoloration noted on facial skin         [] Abnormal -            Psychiatric:       [x] Normal Affect [] Abnormal -       Other pertinent observable physical exam findings:-          Assessment:       ICD-10-CM ICD-9-CM    1. Obstructive sleep apnea (adult) (pediatric)  G47.33 327.23 POLYSOMNOGRAPHY 1 NIGHT   2. Essential hypertension  I10 401.9          Plan:       * Sleep testing was ordered for initial evaluation.   She prefers an in-lab test (however if not authorized by her insurance carrier, she is willing to proceed with HSAT)  * She was provided information on sleep apnea including coresponding risk factors and the importance of proper treatment. * Treatment options for sleep apnea were reviewed today. Patient agrees to a trial of PAP therapy if indicated. * Counseling was provided regarding proper sleep hygiene, appropriate sleep schedule, need for sleep environment safety and safe driving. * Effect of sleep disturbance on weight was reviewed. She was encouraged to start an exercise regimen. We have recommended a dedicated weight loss through appropriate diet and an exercise regimen as significant weight reduction has been shown to reduce severity of obstructive sleep apnea. * Patient agrees to telephone follow-up by sleep technologist shortly after sleep study to review results and plan final management. 2. Hypertension - she continues on her current regimen. I have reviewed the relationship between hypertension as it relates to sleep-disordered breathing. The treatment plan was reviewed with the patient in detail . she understands that the lead technologist will be calling her  with the results and assisting with the next step in the treatment plan as outlined today during the consultation with me. All of her questions were addressed. Thank you for allowing us to participate in your patient's medical care. We'll keep you updated on these investigations. Pursuant to the emergency declaration under the [address] [facility] [address] waiver authority and the [employer] General Act, this Virtual  Visit was conducted, with patient's consent, to reduce the patient's risk of exposure to COVID-19 and provide continuity of care for an established patient. Services were provided through a video synchronous discussion virtually to substitute for in-person clinic visit.     Maame Siddiqui, MD    Electronically signed by    Luci Perez MD  Diplomate in Sleep Medicine  [facility]

## 2021-05-26 NOTE — Clinical Note
Thank you for the referral.  I will keep you informed of her progress.   [address],  [place]anda [place]

## 2021-06-06 ENCOUNTER — HOSPITAL ENCOUNTER (OUTPATIENT)
Dept: SLEEP MEDICINE | Age: 66
Discharge: HOME | End: 2021-06-06
Attending: INTERNAL MEDICINE
Payer: COMMERCIAL

## 2021-06-06 VITALS
OXYGEN SATURATION: 99 % | DIASTOLIC BLOOD PRESSURE: 82 MMHG | SYSTOLIC BLOOD PRESSURE: 138 MMHG | HEART RATE: 80 BPM | TEMPERATURE: 98 F

## 2021-06-06 DIAGNOSIS — G47.33 OBSTRUCTIVE SLEEP APNEA (ADULT) (PEDIATRIC): ICD-10-CM

## 2021-06-06 PROCEDURE — 95810 POLYSOM 6/> YRS 4/> PARAM: CPT | Performed by: INTERNAL MEDICINE

## 2021-06-07 ENCOUNTER — DOCUMENTATION ONLY (OUTPATIENT)
Dept: SLEEP MEDICINE | Age: 66
End: 2021-06-07

## 2021-06-07 NOTE — PROGRESS NOTES
PRE-Test:    Dakota Willams (: 59) arrived in the lobby. Patient was greeted, temperature checked (98.0°) and screening questions asked. The patient was taken directly to assigned room. BP (138/82) and SpO2 (99%) were recorded. Patient reported weight 198 lbs. Procedure explained to patient and questions were answered. The patient expressed understanding. Acquisition Notes:     Lights off: 10:22PM  Sleep onset: 10:30 PM  Respiratory events:  Obstructive, central, and mixed events  Snore:  Loud  ECG:  NSR  Leg mvmts:  No     Patient slept in all positions but prone. Patient entered all stages of sleep. Nocturia 1X. POST Test:  Patient awakened. Electrodes were removed without incident. The patient was discharged after answering the Post Questionnaire. Equipment and room cleaned per infection control policy.

## 2021-06-16 ENCOUNTER — TELEPHONE (OUTPATIENT)
Dept: SLEEP MEDICINE | Age: 66
End: 2021-06-16

## 2021-06-16 DIAGNOSIS — Z11.59 SPECIAL SCREENING EXAMINATION FOR UNSPECIFIED VIRAL DISEASE: Primary | ICD-10-CM

## 2021-06-16 DIAGNOSIS — G47.33 OBSTRUCTIVE SLEEP APNEA (ADULT) (PEDIATRIC): ICD-10-CM

## 2021-06-16 NOTE — TELEPHONE ENCOUNTER
Results of sleep study in [facility] to convey results to patient  Attended polysomnogram showed an AHI of 16/hour and lowest oxygen saturation was 77%. This is consistent with signficant sleep apnea. She appeared to sleep fairly well in the lab. Sleep efficiency was 86%. She fell asleep in less than 10 minutes. All stage of sleep noted. Loud snoring observed. I recommend that she return to the sleep center for an attended PAP titration where we will be able to find the pressure setting that best controls her sleep apnea and allows her the opportunity to adjust to PAP therapy. Mask options will be presented at this time. Once results reviewed, I will order a PAP device for her to use at home and we will see her in follow-up about 6-8 weeks after initiation of PAP. she will be called with results.

## 2021-06-16 NOTE — TELEPHONE ENCOUNTER
Reviewed sleep study results with patient. She expressed understanding and is willing to proceed with a CPAP Titration. Please schedule titration study. Patient has had COVID vaccine.       Thanks

## 2021-06-17 ENCOUNTER — DOCUMENTATION ONLY (OUTPATIENT)
Dept: SLEEP MEDICINE | Age: 66
End: 2021-06-17

## 2021-07-18 ENCOUNTER — HOSPITAL ENCOUNTER (OUTPATIENT)
Dept: SLEEP MEDICINE | Age: 66
Discharge: HOME | End: 2021-07-18
Payer: COMMERCIAL

## 2021-07-18 VITALS
SYSTOLIC BLOOD PRESSURE: 126 MMHG | OXYGEN SATURATION: 98 % | TEMPERATURE: 98.7 F | DIASTOLIC BLOOD PRESSURE: 78 MMHG | HEART RATE: 68 BPM

## 2021-07-18 DIAGNOSIS — G47.33 OBSTRUCTIVE SLEEP APNEA (ADULT) (PEDIATRIC): ICD-10-CM

## 2021-07-18 PROCEDURE — 95811 POLYSOM 6/>YRS CPAP 4/> PARM: CPT | Performed by: INTERNAL MEDICINE

## 2021-07-19 ENCOUNTER — DOCUMENTATION ONLY (OUTPATIENT)
Dept: SLEEP MEDICINE | Age: 66
End: 2021-07-19

## 2021-07-19 NOTE — PROGRESS NOTES
Sleep Study Technical Notes     PRE-Test:    Thomas Solders (: 59) arrived in the lobby. Patient was greeted, temperature checked (98.7°) and screening questions asked. The patient was taken directly to assigned room. BP (126/78) and SpO2 (98%) were recorded. Patient weight of 198 lbs. Procedure explained to patient and questions were answered. The patient expressed understanding. Acquisition Notes:     Lights off: 9:53PM  Sleep onset: 10:04 PM  Respiratory events:  None on final pressure  Snore:  None on final pressure   ECG:  NSR  Leg mvmts:  Yes  Mask(s) used:  Resmed P30i nasal pillows, small  Final pressure:  14 cmH2O EPR 3     Patient entered all stages of sleep. She slept in all positions but prone. Nocturia 0X. POST Test:  Patient awakened. Electrodes were removed without incident. The patient was discharged after answering the Post Questionnaire. Equipment and room cleaned per infection control policy.

## 2021-07-26 DIAGNOSIS — I10 ESSENTIAL HYPERTENSION: ICD-10-CM

## 2021-07-27 RX ORDER — AMLODIPINE BESYLATE 5 MG/1
5 TABLET ORAL DAILY
Qty: 90 TABLET | Refills: 0 | Status: SHIPPED | OUTPATIENT
Start: 2021-07-27 | End: 2022-01-14 | Stop reason: SDUPTHER

## 2021-07-28 ENCOUNTER — TELEPHONE (OUTPATIENT)
Dept: SLEEP MEDICINE | Age: 66
End: 2021-07-28

## 2021-07-28 DIAGNOSIS — G47.33 OBSTRUCTIVE SLEEP APNEA (ADULT) (PEDIATRIC): Primary | ICD-10-CM

## 2021-07-28 NOTE — TELEPHONE ENCOUNTER
Results of sleep study in [facility]  Lead tech to convey results to patient  Initial Apnea/Hypopnea index was 16/hour. she elected to proceed with a positive airway pressure trial (CPAP) and a titration study was ordered. Most of the respiratory events were resolved on CPAP 13 cmH2O. I will order a PAP device for her home use. It will start lower than the best pressure attained in the sleep center for her comfort and will adjust up as needed during sleep. PAP would be an effective mode of therapy. APAP order attached. she should be seen in the sleep disorder center 4-6 weeks after initiating PAP therapy. The APAP will have modem access so she can call the sleep center if she  has questions/concerns regarding the initial PAP settings. Front staff to Order PAP and call patient and let them know which DME company they should be hearing from after results reviewed with lead support technologist.   Schedule for first adherence visit in 6 weeks.

## 2021-07-29 ENCOUNTER — DOCUMENTATION ONLY (OUTPATIENT)
Dept: SLEEP MEDICINE | Age: 66
End: 2021-07-29

## 2021-10-06 ENCOUNTER — OFFICE VISIT (OUTPATIENT)
Dept: SLEEP MEDICINE | Age: 66
End: 2021-10-06
Payer: COMMERCIAL

## 2021-10-06 ENCOUNTER — DOCUMENTATION ONLY (OUTPATIENT)
Dept: SLEEP MEDICINE | Age: 66
End: 2021-10-06

## 2021-10-06 VITALS
WEIGHT: 208.8 LBS | TEMPERATURE: 98.2 F | OXYGEN SATURATION: 100 % | HEART RATE: 74 BPM | SYSTOLIC BLOOD PRESSURE: 139 MMHG | DIASTOLIC BLOOD PRESSURE: 81 MMHG | BODY MASS INDEX: 33.56 KG/M2 | HEIGHT: 66 IN

## 2021-10-06 DIAGNOSIS — G47.33 OBSTRUCTIVE SLEEP APNEA (ADULT) (PEDIATRIC): Primary | ICD-10-CM

## 2021-10-06 DIAGNOSIS — I10 ESSENTIAL HYPERTENSION: ICD-10-CM

## 2021-10-06 PROCEDURE — 99213 OFFICE O/P EST LOW 20 MIN: CPT | Performed by: NURSE PRACTITIONER

## 2021-10-06 NOTE — PROGRESS NOTES
[address] [place]. [place], [address]   Tel.  [phone]   Fax. [address]   [place], [address] [facility]   Tel.  [phone]   Fax. [phone] [address]    Tel.  [phone]   Fax. [phone]     Wilber Kay (: 1955) is a 61 y.o. female, established patient, seen for positive airway pressure follow-up and evaluation. She was last seen by Dr. Riki Bautista on 2021, prior notes reviewed in detail. In lab sleep test 2021 showed AHI of 15.8/hr with a lowest SaO2 of 77%, duration of SaO2 < 88% 2.6 min. ASSESSMENT/PLAN:    ICD-10-CM ICD-9-CM    1. Obstructive sleep apnea (adult) (pediatric)  G47.33 327.23 AMB SUPPLY ORDER   2. Essential hypertension  I10 401.9    3. Adult BMI 33.0-33.9 kg/sq m  Z68.33 V85.33        AHI = 15.8(2021). On APAP, Resmed :  10-13 cmH2O. Set up 2021. She is adherent with PAP therapy and PAP continues to benefit patient and remains necessary for control of her sleep apnea. Follow-up and Dispositions    · Return in about 1 year (around 10/6/2022). 1. Sleep Apnea -  Continue on current pressures    * Supplies ordered - nasal pillows mask and heated tubing    Orders Placed This Encounter    AMB SUPPLY ORDER     Diagnosis: (G47.33) DANIELITO (obstructive sleep apnea)  (primary encounter diagnosis)     Replacement Supplies for Positive Airway Pressure Therapy Device:   Duration of need: 99 months.  Nasal Pillows (Replace) 2 per month.  Nasal Interface Mask 1 every 3 months.  Pos Airway pressure chin strap   Headgear 1 every 6 months.  Tubing with heating element 1 every 3 months.  Filter(s) Disposable 2 per month.  Filter(s) Non-Disposable 1 every 6 months. .   [address] [facility] for Humidifier (Replace) 1 every 6 months. DEL Thorpe-BC; NPI: [id number]    Electronically signed.  Date:- 10/06/21 * Counseling was provided regarding the importance of regular PAP use with emphasis on ensuring sufficient total sleep time, proper sleep hygiene, and safe driving. * Re-enforced proper and regular cleaning for the device. We discussed the risk associated with use of cleaning devices and she will continue with use of dish soap and water as the appropriate cleaning method. She has a UV . * She was asked to contact our office for any problems regarding PAP therapy. 2. Hypertension -  continue on her current regimen, she will continue to monitor her BP and follow up with her PMD for reevaluation/adjustment of medications if warranted. I have reviewed the relationship between hypertension as it relates to sleep-disordered breathing. 3. Recommended a dedicated weight loss program through appropriate diet and exercise regimen as significant weight reduction has been shown to reduce severity of obstructive sleep apnea. SUBJECTIVE/OBJECTIVE:    She  is seen today for follow up on PAP device and reports no problems using the device. The following concerns identified:    Drowsiness no Problems exhaling no   Snoring no Forget to put on no   Mask Comfortable yes Can't fall asleep no   Dry Mouth no Mask falls off no   Air Leaking no Frequent awakenings no       She admits that her sleep has improved on PAP therapy using nasal pillows mask and heated tubing. She notes that the pillows leak some air, she will re-try the medium cushion, she has been using a small. She will try a humidifier in the bedroom. Overall she is waking up less and more rested. Review of device download indicated:  Auto pressure: 10-13 cmH2O; Max Pressure: 13.0 cmH2O;  95th percentile Pressure: 12.7 cmH2O   95th Percentile Leak: 5.6 L/Min     % Used Days >= 4 hours: 87.  Avg hours used:  7:56. Therapy Apnea Index averaged over PAP use: 0.7 /hr which reflects improved sleep breathing condition.     [place] Sleepiness Score: 7 and Modified F.O.S.Q. Score Total / 2: 17.5 which reflects improved sleep quality over therapy time. Sleep Review of Systems: notable for Negative difficulty falling asleep; Negative awakenings at night; Negative early morning headaches; Negative memory problems; Negative concentration issues; Negative chest pain; Negative shortness of breath; Negative significant joint pain at night; Negative significant muscle pain at night; Negative rashes or itching; Negative heartburn; Negative significant mood issues; 0 afternoon naps per week      Visit Vitals  /81 (BP 1 Location: Left upper arm, BP Patient Position: Sitting, BP Cuff Size: Adult)   Pulse 74   Temp 98.2 °F (36.8 °C) (Temporal)   Ht 5' 6\" (1.676 m)   Wt 208 lb 12.8 oz (94.7 kg)   LMP 11/08/2010   SpO2 100%   BMI 33.70 kg/m²          General:   Alert, oriented, not in acute distress   Eyes:  Anicteric Sclerae; no obvious strabismus   Nose:  No obvious nasal septum deviation    Neck:   Midline trachea   Chest/Lungs:  Symmetrical lung expansion, clear lung fields on auscultation    CVS:  Normal rate, regular rhythm,  no JVD   Extremities:  No obvious rashes, no edema    Neuro:  No focal deficits; No obvious tremor    Psych:  Normal affect,  normal countenance     Patient's phone number [phone] (home)  was reviewed and confirmed for accuracy. She gives permission for messages regarding results and appointments to be left at that number. An electronic signature was used to authenticate this note.     -- Lynn Hernandez NP, [facility]  10/06/21

## 2021-10-06 NOTE — PATIENT INSTRUCTIONS
[address] [facility] [place]. [place], [address]  Tel.  [phone]  Fax. [address] [facility] [address]  [place], [address] [facility]  Tel.  [phone]  Fax. [phone] [address]  Tel.  [phone]  Fax. [phone]     Learning About CPAP for Sleep Apnea  What is CPAP? CPAP is a small machine that you use at home every night while you sleep. It increases air pressure in your throat to keep your airway open. When you have sleep apnea, this can help you sleep better so you feel much better. CPAP stands for \"continuous positive airway pressure. \"  The CPAP machine will have one of the following:  · A mask that covers your nose and mouth  · Prongs that fit into your nose  · A mask that covers your nose only, the most common type. This type is called NCPAP. The N stands for \"nasal.\"  Why is it done? CPAP is usually the best treatment for obstructive sleep apnea. It is the first treatment choice and the most widely used. Your doctor may suggest CPAP if you have:  · Moderate to severe sleep apnea. · Sleep apnea and coronary artery disease (CAD) or heart failure. How does it help? · CPAP can help you have more normal sleep, so you feel less sleepy and more alert during the daytime. · CPAP may help keep heart failure or other heart problems from getting worse. · NCPAP may help lower your blood pressure. · If you use CPAP, your bed partner may also sleep better because you are not snoring or restless. What are the side effects? Some people who use CPAP have:  · A dry or stuffy nose and a sore throat. · Irritated skin on the face. · Sore eyes. · Bloating. If you have any of these problems, work with your doctor to fix them. Here are some things you can try:  · Be sure the mask or nasal prongs fit well. · See if your doctor can adjust the pressure of your CPAP. · If your nose is dry, try a humidifier.   · If your nose is runny or stuffy, try decongestant medicine or a steroid nasal spray. If these things do not help, you might try a different type of machine. Some machines have air pressure that adjusts on its own. Others have air pressures that are different when you breathe in than when you breathe out. This may reduce discomfort caused by too much pressure in your nose. Where can you learn more? Go to [employer]  Enter Thornton Hasninfa in the search box to learn more about \"Learning About CPAP for Sleep Apnea. \"   © 2012-8357 Healthwise, Incorporated. Care instructions adapted under license by [employer] (which disclaims liability or warranty for this information). This care instruction is for use with your licensed healthcare professional. If you have questions about a medical condition or this instruction, always ask your healthcare professional. [address] any warranty or liability for your use of this information. Content Version: 4.5.13286; Last Revised: January 11, 2010  PROPER SLEEP HYGIENE    What to avoid  · Do not have drinks with caffeine, such as coffee or black tea, for 8 hours before bed. · Do not smoke or use other types of tobacco near bedtime. Nicotine is a stimulant and can keep you awake. · Avoid drinking alcohol late in the evening, because it can cause you to wake in the middle of the night. · Do not eat a big meal close to bedtime. If you are hungry, eat a light snack. · Do not drink a lot of water close to bedtime, because the need to urinate may wake you up during the night. · Do not read or watch TV in bed. Use the bed only for sleeping and sexual activity. What to try  · Go to bed at the same time every night, and wake up at the same time every morning. Do not take naps during the day. · Keep your bedroom quiet, dark, and cool. · Get regular exercise, but not within 3 to 4 hours of your bedtime. .  · Sleep on a comfortable pillow and mattress.   · If watching the clock makes you anxious, turn it facing away from you so you cannot see the time. · If you worry when you lie down, start a worry book. Well before bedtime, write down your worries, and then set the book and your concerns aside. · Try meditation or other relaxation techniques before you go to bed. · If you cannot fall asleep, get up and go to another room until you feel sleepy. Do something relaxing. Repeat your bedtime routine before you go to bed again. · Make your house quiet and calm about an hour before bedtime. Turn down the lights, turn off the TV, log off the computer, and turn down the volume on music. This can help you relax after a busy day. Drowsy Driving: The [facility] cites drowsiness as a causing factor in more than 734,599 police reported crashes annually, resulting in 76,000 injuries and 1,500 deaths. Other surveys suggest 55% of people polled have driven while drowsy in the past year, 23% had fallen asleep but not crashed, 3% crashed, and 2% had and accident due to drowsy driving. Who is at risk? Young Drivers: One study of drowsy driving accidents states that 55% of the drivers were under 25 years. Of those, 75% were male. Shift Workers and Travelers: People who work overnight or travel across time zones frequently are at higher risk of experiencing Circadian Rhythm Disorders. They are trying to work and function when their body is programed to sleep. Sleep Deprived: Lack of sleep has a serious impact on your ability to pay attention or focus on a task. Consistently getting less than the average of 8 hours your body needs creates partial or cumulative sleep deprivation. Untreated Sleep Disorders: Sleep Apnea, Narcolepsy, R.L.S., and other sleep disorders (untreated) prevent a person from getting enough restful sleep. This leads to excessive daytime sleepiness and increases the risk for drowsy driving accidents by up to 7 times.   Medications / Alcohol: Even over the counter medications can cause drowsiness. Medications that impair a drivers attention should have a warning label. Alcohol naturally makes you sleepy and on its own can cause accidents. Combined with excessive drowsiness its effects are amplified. Signs of Drowsy Driving:   * You don't remember driving the last few miles   * You may drift out of your lui   * You are unable to focus and your thoughts wander   * You may yawn more often than normal   * You have difficulty keeping your eyes open / nodding off   * Missing traffic signs, speeding, or tailgating  Prevention-   Good sleep hygiene, lifestyle and behavioral choices have the most impact on drowsy driving. There is no substitute for sleep and the average person requires 8 hours nightly. If you find yourself driving drowsy, stop and sleep. Consider the sleep hygiene tips provided during your visit as well. Medication Refill Policy: Refills for all medications require 1 week advance notice. Please have your pharmacy fax a refill request. We are unable to fax, or call in \"controled substance\" medications and you will need to pick these prescriptions up from our office. [employer] Activation    Thank you for requesting access to [employer]. Please follow the instructions below to securely access and download your online medical record. [employer] Integration allows you to send messages to your doctor, view your test results, renew your prescriptions, schedule appointments, and more. How Do I Sign Up? 1. In your internet browser, go to [email or web address]. 2. Click on the First Time User? Click Here link in the Sign In box. You will see the New Member Sign Up page. 3. Enter your [employer] Access Code exactly as it appears below. You will not need to use this code after youve completed the sign-up process. If you do not sign up before the expiration date, you must request a new code. [employer] Integration Access Code:  Activation code not generated  Current [employer] Status: Active (This is the date your [employer] access code will )    4. Enter the last four digits of your Social Security Number (xxxx) and Date of Birth (mm/dd/yyyy) as indicated and click Submit. You will be taken to the next sign-up page. 5. Create a [employer] ID. This will be your [employer] login ID and cannot be changed, so think of one that is secure and easy to remember. 6. Create a [employer] password. You can change your password at any time. 7. Enter your Password Reset Question and Answer. This can be used at a later time if you forget your password. 8. Enter your e-mail address. You will receive e-mail notification when new information is available in [address] 9. Click Sign Up. You can now view and download portions of your medical record. 10. Click the Download Summary menu link to download a portable copy of your medical information. Additional Information    If you have questions, please call [phone]. Remember, [employer] is NOT to be used for urgent needs. For medical emergencies, dial 911.

## 2021-10-08 ENCOUNTER — TRANSCRIBE ORDER (OUTPATIENT)
Dept: SCHEDULING | Age: 66
End: 2021-10-08

## 2021-10-08 DIAGNOSIS — Z12.31 SCREENING MAMMOGRAM FOR HIGH-RISK PATIENT: Primary | ICD-10-CM

## 2021-10-11 ENCOUNTER — TELEPHONE (OUTPATIENT)
Dept: PRIMARY CARE CLINIC | Age: 66
End: 2021-10-11

## 2021-10-11 DIAGNOSIS — E03.9 ACQUIRED HYPOTHYROIDISM: Primary | ICD-10-CM

## 2021-10-11 DIAGNOSIS — R73.03 PREDIABETES: ICD-10-CM

## 2021-10-11 DIAGNOSIS — I10 ESSENTIAL HYPERTENSION: ICD-10-CM

## 2021-10-11 DIAGNOSIS — E78.5 HYPERLIPIDEMIA, UNSPECIFIED HYPERLIPIDEMIA TYPE: ICD-10-CM

## 2021-10-11 NOTE — TELEPHONE ENCOUNTER
Patient would like doctor Beckett to put in a lab order so she can have her labs drawn tomorrow before her next appt.  On Friday, October 15, 2021

## 2021-10-15 ENCOUNTER — OFFICE VISIT (OUTPATIENT)
Dept: PRIMARY CARE CLINIC | Age: 66
End: 2021-10-15
Payer: COMMERCIAL

## 2021-10-15 VITALS
SYSTOLIC BLOOD PRESSURE: 140 MMHG | HEIGHT: 66 IN | RESPIRATION RATE: 16 BRPM | DIASTOLIC BLOOD PRESSURE: 82 MMHG | BODY MASS INDEX: 33.3 KG/M2 | TEMPERATURE: 97.5 F | OXYGEN SATURATION: 99 % | HEART RATE: 73 BPM | WEIGHT: 207.2 LBS

## 2021-10-15 DIAGNOSIS — E78.5 HYPERLIPIDEMIA LDL GOAL <100: Primary | ICD-10-CM

## 2021-10-15 DIAGNOSIS — I10 ESSENTIAL HYPERTENSION: ICD-10-CM

## 2021-10-15 DIAGNOSIS — E03.9 ACQUIRED HYPOTHYROIDISM: ICD-10-CM

## 2021-10-15 PROCEDURE — 99214 OFFICE O/P EST MOD 30 MIN: CPT | Performed by: FAMILY MEDICINE

## 2021-10-15 RX ORDER — ESTRADIOL 0.75 MG/1.25G
GEL, METERED TOPICAL
COMMUNITY

## 2021-10-15 RX ORDER — HYDROCHLOROTHIAZIDE 12.5 MG/1
12.5 TABLET ORAL DAILY
Qty: 90 TABLET | Refills: 0 | Status: SHIPPED | OUTPATIENT
Start: 2021-10-15 | End: 2022-01-07

## 2021-10-15 RX ORDER — PRAVASTATIN SODIUM 20 MG/1
20 TABLET ORAL
Qty: 90 TABLET | Refills: 1 | Status: SHIPPED | OUTPATIENT
Start: 2021-10-15 | End: 2022-01-14

## 2021-10-15 NOTE — PROGRESS NOTES
Subjective:     Chief Complaint   Patient presents with    Follow-up     blood pressure and thyroid        She  is a 61y.o. year old female here for follow up on BP and thyroid. Labs are already done and reviewed with patient today. TSH level is normal.      HTN: She is currently on Amlodipine. BP is uncontrolled. Notice ankle swelling. Hypothyroid: She is compliant with Synthroid 75 mcg daily. A1c is 5.7. ASCVD score is 12.65%    Lab Results   Component Value Date/Time    Cholesterol, total 202 (H) 10/12/2021 07:30 AM    HDL Cholesterol 48 10/12/2021 07:30 AM    LDL, calculated 126.8 (H) 10/12/2021 07:30 AM    VLDL, calculated 27.2 10/12/2021 07:30 AM    Triglyceride 136 10/12/2021 07:30 AM    CHOL/HDL Ratio 4.2 10/12/2021 07:30 AM     Lab Results   Component Value Date/Time    TSH 1.220 10/12/2021 07:30 AM    TSH 0.244 (L) 01/10/2020 10:29 AM    T4, Free 1.31 01/10/2020 10:29 AM     Lab Results   Component Value Date/Time    Sodium 141 10/12/2021 07:30 AM    Potassium 4.4 10/12/2021 07:30 AM    Chloride 111 (H) 10/12/2021 07:30 AM    CO2 27 10/12/2021 07:30 AM    Anion gap 3 (L) 10/12/2021 07:30 AM    Glucose 103 (H) 10/12/2021 07:30 AM    BUN 10 10/12/2021 07:30 AM    Creatinine 0.94 10/12/2021 07:30 AM    BUN/Creatinine ratio 11 (L) 10/12/2021 07:30 AM    GFR est AA >60 10/12/2021 07:30 AM    GFR est non-AA 60 (L) 10/12/2021 07:30 AM    Calcium 8.7 10/12/2021 07:30 AM    Bilirubin, total 0.3 10/12/2021 07:30 AM    Alk. phosphatase 147 (H) 10/12/2021 07:30 AM    Protein, total 6.5 10/12/2021 07:30 AM    Albumin 3.3 (L) 10/12/2021 07:30 AM    Globulin 3.2 10/12/2021 07:30 AM    A-G Ratio 1.0 (L) 10/12/2021 07:30 AM    ALT (SGPT) 27 10/12/2021 07:30 AM    AST (SGOT) 13 (L) 10/12/2021 07:30 AM     Lab Results   Component Value Date/Time    Hemoglobin A1c 5.7 (H) 10/12/2021 07:30 AM       Pertinent items are noted in HPI.   Objective:     Vitals:    10/15/21 0818 10/15/21 0823   BP: (!) 158/84 (!) 147/78   Pulse: 73    Resp: 16    Temp: 97.5 °F (36.4 °C)    TempSrc: Temporal    SpO2: 99%    Weight: 207 lb 3.2 oz (94 kg)    Height: 5' 6\" (1.676 m)        Physical Examination: General appearance - alert, well appearing, and in no distress, oriented to person, place, and time and overweight  Mental status - alert, oriented to person, place, and time, normal mood, behavior, speech, dress, motor activity, and thought processes  Chest - clear to auscultation, no wheezes, rales or rhonchi, symmetric air entry  Heart - normal rate, regular rhythm, normal S1, S2, no murmurs, rubs, clicks or gallops  Extremities - pedal edema 1 +     Allergies   Allergen Reactions    Amoxicillin Hives      Social History     Socioeconomic History    Marital status:      Spouse name: Not on file    Number of children: Not on file    Years of education: Not on file    Highest education level: Not on file   Tobacco Use    Smoking status: Former Smoker     Packs/day: 0.50     Years: 20.00     Pack years: 10.00     Start date: 10/15/1979     Quit date: 10/15/1999     Years since quittin.0    Smokeless tobacco: Never Used   Vaping Use    Vaping Use: Never used   Substance and Sexual Activity    Alcohol use: Yes     Comment: social    Drug use: No    Sexual activity: Not Currently     Social Determinants of Health     Financial Resource Strain:     Difficulty of Paying Living Expenses:    Food Insecurity:     Worried About Running Out of Food in the Last Year:     Ran Out of Food in the Last Year:    Transportation Needs:     Lack of Transportation (Medical):      Lack of Transportation (Non-Medical):    Physical Activity:     Days of Exercise per Week:     Minutes of Exercise per Session:    Stress:     Feeling of Stress :    Social Connections:     Frequency of Communication with Friends and Family:     Frequency of Social Gatherings with Friends and Family:     Attends Zoroastrianism Services:     Active Member of Clubs or Organizations:     Attends Club or Organization Meetings:     Marital Status:       Family History   Problem Relation Age of Onset    No Known Problems Mother     Cancer Father         Lung cancer    Diabetes Brother     Diabetes Brother     Breast Cancer Cousin       Past Surgical History:   Procedure Laterality Date    HX CYST INCISION AND DRAINAGE Left 2010    benign    HX FRACTURE TX Right     HX GYN      tubal ligation. Fibroid removal.    HX HYSTERECTOMY  03/2016      Past Medical History:   Diagnosis Date    Hypercholesterolemia     Hypertension     Menopause     Thyroid disease       Current Outpatient Medications   Medication Sig Dispense Refill    estradioL (EstroGel) 1.25 gram/actuation glpm by TransDERmal route.  levothyroxine (SYNTHROID) 75 mcg tablet TAKE 1 TABLET BY MOUTH EVERY DAY BEFORE BREAKFAST 90 Tablet 0    amLODIPine (NORVASC) 5 mg tablet Take 1 Tablet by mouth daily. 90 Tablet 0    aspirin delayed-release 81 mg tablet Take  by mouth daily.  estradiol (ESTRACE) 0.01 % (0.1 mg/gram) vaginal cream APPLY 0.5 (ONE HALF) GRAM VAGINALLY TWICE WEEKLY (Patient not taking: Reported on 10/15/2021)      estradiol (VIVELLE) 0.05 mg/24 hr APPLY 1 PATCH TOPICALLY TWICE A WEEK. REMOVE [facility] EACH TIME. (Patient not taking: Reported on 10/15/2021)          Assessment/ Plan:   Diagnoses and all orders for this visit:    1. Hyperlipidemia LDL goal <100  -   ASCVD score is 12.65%  Start   pravastatin (PRAVACHOL) 20 mg tablet; Take 1 Tablet by mouth nightly. 2. Essential hypertension  -   Start   hydroCHLOROthiazide (HYDRODIURIL) 12.5 mg tablet; Take 1 Tablet by mouth daily. Continue Amlodipine. 3. Acquired hypothyroidism      Well controlled with current synthroid. Medication risks/benefits/costs/interactions/alternatives discussed with patient.   Advised patient to call back or return to office if symptoms worsen/change/persist. If patient cannot reach us or should anything more severe/urgent arise he/she should proceed directly to the nearest emergency department. Discussed expected course/resolution/complications of diagnosis in detail with patient. Patient given a written after visit summary which includes her diagnoses, current medications and vitals. Patient expressed understanding with the diagnosis and plan. Follow-up and Dispositions    · Return in about 3 months (around 1/15/2022), or if symptoms worsen or fail to improve, for Bring BP log book. , cholesterol.

## 2021-10-15 NOTE — PROGRESS NOTES
Chief Complaint   Patient presents with    Follow-up     blood pressure and thyroid       There are no preventive care reminders to display for this patient. 1. Have you been to the ER, urgent care clinic since your last visit? Hospitalized since your last visit? No    2. Have you seen or consulted any other health care providers outside of the [address] [place] [address] since your last visit? Include any pap smears or colon screening.  No    Visit Vitals  BP (!) 147/78 (BP 1 Location: Right arm, BP Patient Position: Sitting, BP Cuff Size: Adult)   Pulse 73   Temp 97.5 °F (36.4 °C) (Temporal)   Resp 16   Ht 5' 6\" (1.676 m)   Wt 207 lb 3.2 oz (94 kg)   LMP 11/08/2010   SpO2 99%   BMI 33.44 kg/m²

## 2022-01-03 ENCOUNTER — HOSPITAL ENCOUNTER (OUTPATIENT)
Dept: MAMMOGRAPHY | Age: 67
Discharge: HOME | End: 2022-01-03
Attending: OBSTETRICS & GYNECOLOGY
Payer: COMMERCIAL

## 2022-01-03 DIAGNOSIS — Z12.31 SCREENING MAMMOGRAM FOR HIGH-RISK PATIENT: ICD-10-CM

## 2022-01-03 PROCEDURE — 77063 BREAST TOMOSYNTHESIS BI: CPT

## 2022-01-06 DIAGNOSIS — E03.9 ACQUIRED HYPOTHYROIDISM: ICD-10-CM

## 2022-01-06 DIAGNOSIS — I10 ESSENTIAL HYPERTENSION: ICD-10-CM

## 2022-01-07 RX ORDER — HYDROCHLOROTHIAZIDE 12.5 MG/1
TABLET ORAL
Qty: 90 TABLET | Refills: 0 | Status: SHIPPED | OUTPATIENT
Start: 2022-01-07 | End: 2022-01-14 | Stop reason: SDUPTHER

## 2022-01-07 RX ORDER — LEVOTHYROXINE SODIUM 75 UG/1
TABLET ORAL
Qty: 90 TABLET | Refills: 0 | Status: SHIPPED | OUTPATIENT
Start: 2022-01-07 | End: 2022-01-14 | Stop reason: SDUPTHER

## 2022-01-10 DIAGNOSIS — E78.5 HYPERLIPIDEMIA LDL GOAL <100: ICD-10-CM

## 2022-01-10 DIAGNOSIS — E03.9 ACQUIRED HYPOTHYROIDISM: Primary | ICD-10-CM

## 2022-01-10 DIAGNOSIS — I10 ESSENTIAL HYPERTENSION: ICD-10-CM

## 2022-01-14 ENCOUNTER — OFFICE VISIT (OUTPATIENT)
Dept: PRIMARY CARE CLINIC | Age: 67
End: 2022-01-14
Payer: COMMERCIAL

## 2022-01-14 VITALS
DIASTOLIC BLOOD PRESSURE: 68 MMHG | BODY MASS INDEX: 33.49 KG/M2 | OXYGEN SATURATION: 98 % | RESPIRATION RATE: 18 BRPM | HEART RATE: 75 BPM | SYSTOLIC BLOOD PRESSURE: 125 MMHG | WEIGHT: 208.4 LBS | TEMPERATURE: 97.8 F | HEIGHT: 66 IN

## 2022-01-14 DIAGNOSIS — I10 ESSENTIAL HYPERTENSION: ICD-10-CM

## 2022-01-14 DIAGNOSIS — E78.5 HYPERLIPIDEMIA LDL GOAL <100: ICD-10-CM

## 2022-01-14 DIAGNOSIS — E03.9 ACQUIRED HYPOTHYROIDISM: ICD-10-CM

## 2022-01-14 PROCEDURE — 99214 OFFICE O/P EST MOD 30 MIN: CPT | Performed by: FAMILY MEDICINE

## 2022-01-14 RX ORDER — LEVOTHYROXINE SODIUM 75 UG/1
75 TABLET ORAL
Qty: 90 TABLET | Refills: 1 | Status: SHIPPED | OUTPATIENT
Start: 2022-01-14 | End: 2022-10-19 | Stop reason: SDUPTHER

## 2022-01-14 RX ORDER — PRAVASTATIN SODIUM 40 MG/1
40 TABLET ORAL
Qty: 90 TABLET | Refills: 1 | Status: SHIPPED | OUTPATIENT
Start: 2022-01-14 | End: 2022-08-19 | Stop reason: SDUPTHER

## 2022-01-14 RX ORDER — AMLODIPINE BESYLATE 5 MG/1
5 TABLET ORAL DAILY
Qty: 90 TABLET | Refills: 2 | Status: SHIPPED | OUTPATIENT
Start: 2022-01-14 | End: 2022-10-19 | Stop reason: SDUPTHER

## 2022-01-14 RX ORDER — HYDROCHLOROTHIAZIDE 12.5 MG/1
12.5 TABLET ORAL DAILY
Qty: 90 TABLET | Refills: 1 | Status: SHIPPED | OUTPATIENT
Start: 2022-01-14 | End: 2022-08-18

## 2022-01-14 NOTE — PROGRESS NOTES
Identified pt with two pt identifiers(name and ). Chief Complaint   Patient presents with    Hypertension        3 most recent PHQ Screens 2022   Little interest or pleasure in doing things Not at all   Feeling down, depressed, irritable, or hopeless Not at all   Total Score PHQ 2 0        Vitals:    22 0745   BP: 125/68   Pulse: 75   Resp: 18   Temp: 97.8 °F (36.6 °C)   TempSrc: Temporal   SpO2: 98%   Weight: 208 lb 6.4 oz (94.5 kg)   Height: 5' 6\" (1.676 m)   PainSc:   0 - No pain   LMP: 2010       There are no preventive care reminders to display for this patient. 1. Have you been to the ER, urgent care clinic since your last visit? Hospitalized since your last visit? No    2. Have you seen or consulted any other health care providers outside of the [address] [place] [address] since your last visit? Include any pap smears or colon screening.  No

## 2022-01-14 NOTE — PROGRESS NOTES
Subjective:     Chief Complaint   Patient presents with    Hypertension        She  is a 61y.o. year old female for follow up on BP and thyroid and cholesterol. Labs are already done and reviewed with patient today. TSH level is normal.      HTN: She is currently on Amlodipine 5 mg and HCTZ. HCTZ was started in last visit. BP is well controlled.        Hypothyroid: She is compliant with Synthroid 75 mcg daily. HLD: Pravastatin 20 mg was started three months ago. Slight improvement noted with cholesterol level. Denies any chest pain, soa, cough. Leg swelling has resolved. Lab Results   Component Value Date/Time    TSH 1.260 01/11/2022 07:31 AM    TSH 0.244 (L) 01/10/2020 10:29 AM    T4, Free 1.31 01/10/2020 10:29 AM     Lab Results   Component Value Date/Time    Cholesterol, total 185 01/11/2022 07:31 AM    HDL Cholesterol 54 01/11/2022 07:31 AM    LDL, calculated 106.6 (H) 01/11/2022 07:31 AM    VLDL, calculated 24.4 01/11/2022 07:31 AM    Triglyceride 122 01/11/2022 07:31 AM    CHOL/HDL Ratio 3.4 01/11/2022 07:31 AM            Pertinent items are noted in HPI.   Objective:     Vitals:    01/14/22 0745   BP: 125/68   Pulse: 75   Resp: 18   Temp: 97.8 °F (36.6 °C)   TempSrc: Temporal   SpO2: 98%   Weight: 208 lb 6.4 oz (94.5 kg)   Height: 5' 6\" (1.676 m)       Physical Examination: General appearance - alert, well appearing, and in no distress, oriented to person, place, and time and overweight  Mental status - alert, oriented to person, place, and time, normal mood, behavior, speech, dress, motor activity, and thought processes  Chest - clear to auscultation, no wheezes, rales or rhonchi, symmetric air entry  Heart - normal rate, regular rhythm, normal S1, S2, no murmurs, rubs, clicks or gallops    Allergies   Allergen Reactions    Amoxicillin Hives      Social History     Socioeconomic History    Marital status:    Tobacco Use    Smoking status: Former Smoker     Packs/day: 0.50     Years: 20.00     Pack years: 10.00     Start date: 10/15/1979     Quit date: 10/15/1999     Years since quittin.2    Smokeless tobacco: Never Used   Vaping Use    Vaping Use: Never used   Substance and Sexual Activity    Alcohol use: Yes     Comment: social    Drug use: No    Sexual activity: Not Currently      Family History   Problem Relation Age of Onset    No Known Problems Mother     Cancer Father         Lung cancer    Diabetes Brother     Diabetes Brother     Breast Cancer Cousin       Past Surgical History:   Procedure Laterality Date    HX CYST INCISION AND DRAINAGE Left 2010    benign    HX FRACTURE TX Right     HX GYN      tubal ligation. Fibroid removal.    HX HYSTERECTOMY  2016      Past Medical History:   Diagnosis Date    Hypercholesterolemia     Hypertension     Menopause     Thyroid disease       Current Outpatient Medications   Medication Sig Dispense Refill    levothyroxine (SYNTHROID) 75 mcg tablet TAKE 1 TABLET BY MOUTH EVERY DAY BEFORE BREAKFAST 90 Tablet 0    hydroCHLOROthiazide (HYDRODIURIL) 12.5 mg tablet TAKE 1 TABLET BY MOUTH EVERY DAY 90 Tablet 0    estradioL (EstroGel) 1.25 gram/actuation glpm by TransDERmal route.  pravastatin (PRAVACHOL) 20 mg tablet Take 1 Tablet by mouth nightly. 90 Tablet 1    amLODIPine (NORVASC) 5 mg tablet Take 1 Tablet by mouth daily. 90 Tablet 0    aspirin delayed-release 81 mg tablet Take  by mouth daily.  estradiol (ESTRACE) 0.01 % (0.1 mg/gram) vaginal cream APPLY 0.5 (ONE HALF) GRAM VAGINALLY TWICE WEEKLY (Patient not taking: Reported on 10/15/2021)      estradiol (VIVELLE) 0.05 mg/24 hr APPLY 1 PATCH TOPICALLY TWICE A WEEK. REMOVE [facility] EACH TIME. (Patient not taking: Reported on 10/15/2021)          Assessment/ Plan:   Diagnoses and all orders for this visit:    1. Hyperlipidemia LDL goal <100  -  Increase  pravastatin (PRAVACHOL) 40 mg tablet; Take 1 Tablet by mouth nightly.           Continue healthy diet and exercise. Follow up in 3-4 months. 2. Essential hypertension  -   Well controlled with current meds. -    amLODIPine (NORVASC) 5 mg tablet; Take 1 Tablet by mouth daily. -     hydroCHLOROthiazide (HYDRODIURIL) 12.5 mg tablet; Take 1 Tablet by mouth daily. 3. Acquired hypothyroidism  -  Well controlled with levothyroxine (SYNTHROID) 75 mcg tablet; Take 1 Tablet by mouth Daily (before breakfast). Medication risks/benefits/costs/interactions/alternatives discussed with patient. Advised patient to call back or return to office if symptoms worsen/change/persist. If patient cannot reach us or should anything more severe/urgent arise he/she should proceed directly to the nearest emergency department. Discussed expected course/resolution/complications of diagnosis in detail with patient. Patient given a written after visit summary which includes her diagnoses, current medications and vitals. Patient expressed understanding with the diagnosis and plan. Follow-up and Dispositions    · Return in about 3 months (around 4/14/2022) for Cholesterol, blood pressure, thyrroid. Smiley Godoy

## 2022-03-18 PROBLEM — E03.9 ACQUIRED HYPOTHYROIDISM: Status: ACTIVE | Noted: 2019-06-21

## 2022-03-19 PROBLEM — R73.03 PREDIABETES: Status: ACTIVE | Noted: 2020-07-29

## 2022-03-31 DIAGNOSIS — I10 ESSENTIAL HYPERTENSION: ICD-10-CM

## 2022-03-31 DIAGNOSIS — E03.9 ACQUIRED HYPOTHYROIDISM: ICD-10-CM

## 2022-03-31 RX ORDER — HYDROCHLOROTHIAZIDE 12.5 MG/1
12.5 TABLET ORAL DAILY
Qty: 90 TABLET | Refills: 1 | Status: CANCELLED | OUTPATIENT
Start: 2022-03-31

## 2022-03-31 RX ORDER — LEVOTHYROXINE SODIUM 75 UG/1
75 TABLET ORAL
Qty: 90 TABLET | Refills: 1 | Status: CANCELLED | OUTPATIENT
Start: 2022-03-31

## 2022-03-31 NOTE — TELEPHONE ENCOUNTER
Medication has refill at the pharmacy already - spoke to pharmacy and informed pt has duplicate profile at pharmacy.  Will inform patient

## 2022-06-08 ENCOUNTER — TELEPHONE (OUTPATIENT)
Dept: PRIMARY CARE CLINIC | Age: 67
End: 2022-06-08

## 2022-08-18 ENCOUNTER — OFFICE VISIT (OUTPATIENT)
Dept: INTERNAL MEDICINE CLINIC | Age: 67
End: 2022-08-18
Payer: COMMERCIAL

## 2022-08-18 VITALS
HEART RATE: 82 BPM | BODY MASS INDEX: 34.13 KG/M2 | SYSTOLIC BLOOD PRESSURE: 138 MMHG | HEIGHT: 66 IN | RESPIRATION RATE: 12 BRPM | WEIGHT: 212.4 LBS | OXYGEN SATURATION: 97 % | DIASTOLIC BLOOD PRESSURE: 78 MMHG

## 2022-08-18 DIAGNOSIS — I10 PRIMARY HYPERTENSION: ICD-10-CM

## 2022-08-18 DIAGNOSIS — Z00.00 WELL ADULT EXAM: Primary | ICD-10-CM

## 2022-08-18 DIAGNOSIS — E03.9 ACQUIRED HYPOTHYROIDISM: ICD-10-CM

## 2022-08-18 DIAGNOSIS — R73.03 PREDIABETES: ICD-10-CM

## 2022-08-18 DIAGNOSIS — E78.2 MIXED HYPERLIPIDEMIA: ICD-10-CM

## 2022-08-18 PROCEDURE — 99214 OFFICE O/P EST MOD 30 MIN: CPT | Performed by: INTERNAL MEDICINE

## 2022-08-18 NOTE — PROGRESS NOTES
Health Maintenance Due   Topic Date Due    COVID-19 Vaccine (4 - Booster for Moderna series) 02/25/2022       Chief Complaint   Patient presents with    Medication Refill    Heart Problem     Pt has a stress test scheduled for the 26th of august 2022. Brother just recently passed from heart attack so Pt wants to get tested. New Patient       1. Have you been to the ER, urgent care clinic since your last visit? Hospitalized since your last visit? No    2. Have you seen or consulted any other health care providers outside of the [address] [place] [address] since your last visit? Include any pap smears or colon screening. No    3) Do you have an Advance Directive on file? no    4) Are you interested in receiving information on Advance Directives? NO      Patient is accompanied by self I have received verbal consent from Charles Murray to discuss any/all medical information while they are present in the room.

## 2022-08-18 NOTE — PROGRESS NOTES
HISTORY OF PRESENT ILLNESS  Matt Blanco is a 61 y.o. female. Patient was seen to establish care. PMH of HLD, and hypothyroid. Patient reports that she had an \"arrhythmia of the heart\" recently. Had a monitor last week, stress test next week and ECHO in a month. Is being followed by cardiology at [facility]. They want me to check her thyroid level. Does not feel this way now. No CP or SOB  HTN stable. Takes Norvasc 5 mg daily. Tries to watch her salt   Mammogram done and so is the colonoscopy. Visit Vitals  /78   Pulse 82   Resp 12   Ht 5' 6\" (1.676 m)   Wt 212 lb 6.4 oz (96.3 kg)   LMP 11/08/2010   SpO2 97%   BMI 34.28 kg/m²     Past Medical History:   Diagnosis Date    Hypercholesterolemia     Hypertension     Menopause     Thyroid disease      Past Surgical History:   Procedure Laterality Date    HX CYST INCISION AND DRAINAGE Left 2010    benign    HX FRACTURE TX Right     HX GYN      tubal ligation. Fibroid removal.    HX HYSTERECTOMY  03/2016    HX WISDOM TEETH EXTRACTION       Family History   Problem Relation Age of Onset    Hypertension Mother     Cancer Father         Lung cancer    Diabetes Brother     Heart Attack Brother     Diabetes Brother         pre DM    Heart Attack Brother     Breast Cancer Cousin      Outpatient Encounter Medications as of 8/18/2022   Medication Sig Dispense Refill    pravastatin (PRAVACHOL) 40 mg tablet Take 1 Tablet by mouth nightly. 90 Tablet 1    amLODIPine (NORVASC) 5 mg tablet Take 1 Tablet by mouth daily. 90 Tablet 2    levothyroxine (SYNTHROID) 75 mcg tablet Take 1 Tablet by mouth Daily (before breakfast). 90 Tablet 1    estradioL (EstroGel) 1.25 gram/actuation glpm by TransDERmal route. [DISCONTINUED] hydroCHLOROthiazide (HYDRODIURIL) 12.5 mg tablet Take 1 Tablet by mouth daily. (Patient not taking: Reported on 8/18/2022) 90 Tablet 1    [DISCONTINUED] aspirin delayed-release 81 mg tablet Take  by mouth daily.  (Patient not taking: Reported on 8/18/2022) [DISCONTINUED] estradiol (ESTRACE) 0.01 % (0.1 mg/gram) vaginal cream APPLY 0.5 (ONE HALF) GRAM VAGINALLY TWICE WEEKLY (Patient not taking: Reported on 8/18/2022)      [DISCONTINUED] estradiol (VIVELLE) 0.05 mg/24 hr APPLY 1 PATCH TOPICALLY TWICE A WEEK. REMOVE [facility] EACH TIME. (Patient not taking: No sig reported)       No facility-administered encounter medications on file as of 8/18/2022. HPI    Review of Systems   All other systems reviewed and are negative. Physical Exam  Vitals and nursing note reviewed. Cardiovascular:      Rate and Rhythm: Normal rate and regular rhythm. Pulmonary:      Effort: Pulmonary effort is normal.      Breath sounds: Normal breath sounds. Abdominal:      General: Bowel sounds are normal.      Palpations: Abdomen is soft. Musculoskeletal:         General: Normal range of motion. Cervical back: Neck supple. Skin:     General: Skin is warm. Neurological:      Mental Status: She is alert and oriented to person, place, and time. Psychiatric:         Behavior: Behavior normal.       ASSESSMENT and PLAN  Diagnoses and all orders for this visit:    1. Well adult exam    2. Mixed hyperlipidemia  -     METABOLIC PANEL, COMPREHENSIVE; Future  -     CBC WITH AUTOMATED DIFF; Future  -     LIPID PANEL; Future    3. Primary hypertension        -     DASH diet         -     Norvasc 5 mg daily   4. Acquired hypothyroidism  -     TSH 3RD GENERATION; Future    5. Prediabetes  -     HEMOGLOBIN A1C WITH EAG;  Future      Follow-up and Dispositions    Return in about 6 months (around 2/18/2023), or if symptoms worsen or fail to improve.       lab results and schedule of future lab studies reviewed with patient  reviewed diet, exercise and weight control  reviewed medications and side effects in detail

## 2022-08-19 DIAGNOSIS — E78.5 HYPERLIPIDEMIA LDL GOAL <100: ICD-10-CM

## 2022-08-19 LAB
ALBUMIN SERPL-MCNC: 4.3 G/DL (ref 3.8–4.8)
ALBUMIN/GLOB SERPL: 1.7 {RATIO} (ref 1.2–2.2)
ALP SERPL-CCNC: 145 IU/L (ref 44–121)
ALT SERPL-CCNC: 23 IU/L (ref 0–32)
AST SERPL-CCNC: 18 IU/L (ref 0–40)
BASOPHILS # BLD AUTO: 0 X10E3/UL (ref 0–0.2)
BASOPHILS NFR BLD AUTO: 1 %
BILIRUB SERPL-MCNC: 0.4 MG/DL (ref 0–1.2)
BUN SERPL-MCNC: 9 MG/DL (ref 8–27)
BUN/CREAT SERPL: 12 (ref 12–28)
CALCIUM SERPL-MCNC: 9.3 MG/DL (ref 8.7–10.3)
CHLORIDE SERPL-SCNC: 105 MMOL/L (ref 96–106)
CHOLEST SERPL-MCNC: 203 MG/DL (ref 100–199)
CO2 SERPL-SCNC: 23 MMOL/L (ref 20–29)
CREAT SERPL-MCNC: 0.73 MG/DL (ref 0.57–1)
EGFR: 92 ML/MIN/1.73
EOSINOPHIL # BLD AUTO: 0.2 X10E3/UL (ref 0–0.4)
EOSINOPHIL NFR BLD AUTO: 4 %
ERYTHROCYTE [DISTWIDTH] IN BLOOD BY AUTOMATED COUNT: 13.1 % (ref 11.7–15.4)
EST. AVERAGE GLUCOSE BLD GHB EST-MCNC: 126 MG/DL
GLOBULIN SER CALC-MCNC: 2.5 G/DL (ref 1.5–4.5)
GLUCOSE SERPL-MCNC: 83 MG/DL (ref 65–99)
HBA1C MFR BLD: 6 % (ref 4.8–5.6)
HCT VFR BLD AUTO: 41.5 % (ref 34–46.6)
HDLC SERPL-MCNC: 53 MG/DL
HGB BLD-MCNC: 13.5 G/DL (ref 11.1–15.9)
IMM GRANULOCYTES # BLD AUTO: 0 X10E3/UL (ref 0–0.1)
IMM GRANULOCYTES NFR BLD AUTO: 0 %
IMP & REVIEW OF LAB RESULTS: NORMAL
LDLC SERPL CALC-MCNC: 131 MG/DL (ref 0–99)
LYMPHOCYTES # BLD AUTO: 2 X10E3/UL (ref 0.7–3.1)
LYMPHOCYTES NFR BLD AUTO: 38 %
MCH RBC QN AUTO: 29.4 PG (ref 26.6–33)
MCHC RBC AUTO-ENTMCNC: 32.5 G/DL (ref 31.5–35.7)
MCV RBC AUTO: 90 FL (ref 79–97)
MONOCYTES # BLD AUTO: 0.3 X10E3/UL (ref 0.1–0.9)
MONOCYTES NFR BLD AUTO: 6 %
NEUTROPHILS # BLD AUTO: 2.7 X10E3/UL (ref 1.4–7)
NEUTROPHILS NFR BLD AUTO: 51 %
PLATELET # BLD AUTO: 237 X10E3/UL (ref 150–450)
POTASSIUM SERPL-SCNC: 3.9 MMOL/L (ref 3.5–5.2)
PROT SERPL-MCNC: 6.8 G/DL (ref 6–8.5)
RBC # BLD AUTO: 4.59 X10E6/UL (ref 3.77–5.28)
SODIUM SERPL-SCNC: 140 MMOL/L (ref 134–144)
TRIGL SERPL-MCNC: 108 MG/DL (ref 0–149)
TSH SERPL DL<=0.005 MIU/L-ACNC: 0.48 UIU/ML (ref 0.45–4.5)
VLDLC SERPL CALC-MCNC: 19 MG/DL (ref 5–40)
WBC # BLD AUTO: 5.3 X10E3/UL (ref 3.4–10.8)

## 2022-08-19 RX ORDER — HYDROCHLOROTHIAZIDE 12.5 MG/1
12.5 TABLET ORAL DAILY
Qty: 90 TABLET | Refills: 1 | Status: SHIPPED | OUTPATIENT
Start: 2022-08-19

## 2022-08-19 RX ORDER — PRAVASTATIN SODIUM 40 MG/1
40 TABLET ORAL
Qty: 90 TABLET | Refills: 1 | Status: SHIPPED | OUTPATIENT
Start: 2022-08-19

## 2022-08-19 NOTE — PROGRESS NOTES
Please call:    Lipids are elevated. Has to mindful of fats, oils and starches   A1c is trending up. And a pre DM still. But has to be careful with how much sugar and carbs she is eating     TSH is normal. But on the lower end.

## 2022-08-19 NOTE — TELEPHONE ENCOUNTER
Called and discussed lab results with pt, she voiced understanding. She states she was taking HCTZ and ran out of the medication, should she be taking this as well has her pravastatin.  I advised I would let provider know

## 2022-08-19 NOTE — TELEPHONE ENCOUNTER
----- Message from Harris Giles NP sent at 8/19/2022  8:39 AM EDT -----  Please call:    Lipids are elevated. Has to mindful of fats, oils and starches   A1c is trending up. And a pre DM still. But has to be careful with how much sugar and carbs she is eating     TSH is normal. But on the lower end.

## 2022-10-12 ENCOUNTER — OFFICE VISIT (OUTPATIENT)
Dept: SLEEP MEDICINE | Age: 67
End: 2022-10-12
Payer: COMMERCIAL

## 2022-10-12 ENCOUNTER — DOCUMENTATION ONLY (OUTPATIENT)
Dept: SLEEP MEDICINE | Age: 67
End: 2022-10-12

## 2022-10-12 VITALS
DIASTOLIC BLOOD PRESSURE: 79 MMHG | SYSTOLIC BLOOD PRESSURE: 130 MMHG | HEIGHT: 66 IN | WEIGHT: 208.6 LBS | BODY MASS INDEX: 33.52 KG/M2 | OXYGEN SATURATION: 98 % | HEART RATE: 71 BPM | TEMPERATURE: 98 F

## 2022-10-12 DIAGNOSIS — G47.33 OBSTRUCTIVE SLEEP APNEA (ADULT) (PEDIATRIC): Primary | ICD-10-CM

## 2022-10-12 PROCEDURE — 99213 OFFICE O/P EST LOW 20 MIN: CPT | Performed by: NURSE PRACTITIONER

## 2022-10-12 PROCEDURE — 1123F ACP DISCUSS/DSCN MKR DOCD: CPT | Performed by: NURSE PRACTITIONER

## 2022-10-12 NOTE — PROGRESS NOTES
[address] [facility] [place]. [place], [address]   Tel.  [phone]   Fax. [address] [facility] [address]   [place], [address] [facility]   Tel.  [phone]   Fax. [phone] [address]   Tel.  [phone]   Fax. [phone]     Ronak Chen (: 1955) is a 74 y.o. female, established patient, seen for positive airway pressure follow-up and evaluation. She was last seen by me on 10/6/2021, previously seen by Dr. Veronica Meredith on 2021, prior notes and sleep testing reviewed in detail. In lab sleep test 2021 showed AHI of 15.8/hr with a lowest SaO2 of 77%, duration of SaO2 < 88% 2.6 min. Weight at time of study was 198 pounds. ASSESSMENT/PLAN:    ICD-10-CM ICD-9-CM    1. Obstructive sleep apnea (adult) (pediatric)  G47.33 327.23 AMB SUPPLY ORDER      2. Adult BMI 33.0-33.9 kg/sq m  Z68.33 V85.33           AHI = 15.8(2021). On APAP, Resmed :  10-13 cmH2O. Set up 2021. She is adherent with PAP therapy and PAP continues to benefit patient and remains necessary for control of her sleep apnea. Follow-up and Dispositions    Return in about 1 year (around 10/12/2023) for annual follow up . Sleep Apnea -  Continue on current pressures    * Supplies ordered - nasal pillows mask and heated tubing    Orders Placed This Encounter    AMB SUPPLY ORDER     Diagnosis: (G47.33) DANIELITO (obstructive sleep apnea)  (primary encounter diagnosis)     Replacement Supplies for Positive Airway Pressure Therapy Device:   Duration of need: 99 months.  Nasal Pillows (Replace) 2 per month.  Nasal Interface Mask 1 every 3 months.  Pos Airway pressure chin strap   Headgear 1 every 6 months.  Tubing with heating element 1 every 3 months.  Filter(s) Disposable 2 per month.  Filter(s) Non-Disposable 1 every 6 months. .   [address] [facility] for Humidifier (Replace) 1 every 6 months.       Gilma Quispe. DEL Larsen-BC; NPI: [id number]    Electronically signed. Date:- 10/12/22       * Counseling was provided regarding the importance of regular PAP use with emphasis on ensuring sufficient total sleep time, proper sleep hygiene, and safe driving. * Re-enforced proper and regular cleaning for the device. We discussed the risk associated with use of cleaning devices and she will continue with use of dish soap and water as the appropriate cleaning method. * She was asked to contact our office for any problems regarding PAP therapy. 2. Encouraged continued weight management program through appropriate diet and exercise regimen as significant weight reduction has been shown to reduce severity of obstructive sleep apnea. She has been walking and gardening. SUBJECTIVE/OBJECTIVE:    She  is seen today for follow up on PAP device and reports no problems using the device. The following concerns identified:    Drowsiness no Problems exhaling no   Snoring no Forget to put on no   Mask Comfortable yes Can't fall asleep no   Dry Mouth no Mask falls off no   Air Leaking no Frequent awakenings no       She admits that her sleep has improved on PAP therapy using nasal pillows mask and heated tubing. Review of device download indicated:  Auto pressure: 10-13 cmH2O; Max Pressure: 12.9 cmH2O;  95th percentile Pressure: 12.6 cmH2O   95th Percentile Leak: 6.4 L/Min     % Used Days >= 4 hours: 70. Avg hours used:  6:17. Therapy Apnea Index averaged over PAP use: 0.4 /hr which reflects improved sleep breathing condition. [place] Sleepiness Score: (P) 5 and Modified F.O.S.Q. Score Total / 2: (P) 19 which reflects improved sleep quality over therapy time. CO2 level 23 mmol/L on labs 8/18/2022. Sleep Review of Systems: notable for Negative difficulty falling asleep; Negative awakenings at night; Negative early morning headaches; Negative memory problems; Negative concentration issues;  Negative chest pain; Negative shortness of breath; Negative significant joint pain at night; Negative significant muscle pain at night; Negative rashes or itching; Occasional heartburn; Negative significant mood issues; occasional  afternoon naps per week      Visit Vitals  /79 (BP 1 Location: Left upper arm, BP Patient Position: Sitting, BP Cuff Size: Adult)   Pulse 71   Temp 98 °F (36.7 °C) (Temporal)   Ht 5' 6\" (1.676 m)   Wt 208 lb 9.6 oz (94.6 kg)   LMP 11/08/2010   SpO2 98%   BMI 33.67 kg/m²          General:   Alert, oriented, not in acute distress   Eyes:  Anicteric Sclerae; no obvious strabismus   Nose:  No obvious nasal septum deviation    Neck:   Midline trachea   Chest/Lungs:  Symmetrical lung expansion, clear lung fields on auscultation    CVS:  Normal rate, regular rhythm,  no JVD   Extremities:  No obvious rashes, no edema    Neuro:  No focal deficits; No obvious tremor    Psych:  Normal affect,  normal countenance     Patient's phone number [phone] (home)  was reviewed and confirmed for accuracy. She gives permission for messages regarding results and appointments to be left at that number. An electronic signature was used to authenticate this note.     -- Sergio Davidson NP, [facility]  10/12/22

## 2022-10-12 NOTE — PATIENT INSTRUCTIONS
[address] [facility], [address]  Tel.  [phone]  Fax. [address]  [place] Buerger, [address] [facility]  Tel.  [phone]  Fax. [phone] [address]  Tel.  [phone]  Fax. [phone]     Learning About CPAP for Sleep Apnea  What is CPAP? CPAP is a small machine that you use at home every night while you sleep. It increases air pressure in your throat to keep your airway open. When you have sleep apnea, this can help you sleep better so you feel much better. CPAP stands for \"continuous positive airway pressure. \"  The CPAP machine will have one of the following:  A mask that covers your nose and mouth  Prongs that fit into your nose  A mask that covers your nose only, the most common type. This type is called NCPAP. The N stands for \"nasal.\"  Why is it done? CPAP is usually the best treatment for obstructive sleep apnea. It is the first treatment choice and the most widely used. Your doctor may suggest CPAP if you have: Moderate to severe sleep apnea. Sleep apnea and coronary artery disease (CAD) or heart failure. How does it help? CPAP can help you have more normal sleep, so you feel less sleepy and more alert during the daytime. CPAP may help keep heart failure or other heart problems from getting worse. NCPAP may help lower your blood pressure. If you use CPAP, your bed partner may also sleep better because you are not snoring or restless. What are the side effects? Some people who use CPAP have:  A dry or stuffy nose and a sore throat. Irritated skin on the face. Sore eyes. Bloating. If you have any of these problems, work with your doctor to fix them. Here are some things you can try:  Be sure the mask or nasal prongs fit well. See if your doctor can adjust the pressure of your CPAP. If your nose is dry, try a humidifier.   If your nose is runny or stuffy, try decongestant medicine or a steroid nasal spray. If these things do not help, you might try a different type of machine. Some machines have air pressure that adjusts on its own. Others have air pressures that are different when you breathe in than when you breathe out. This may reduce discomfort caused by too much pressure in your nose. Where can you learn more? Go to [employer]  Enter Juan Pablo Baptiste in the search box to learn more about \"Learning About CPAP for Sleep Apnea. \"   © 7529-6624 Healthwise, Incorporated. Care instructions adapted under license by [employer] (which disclaims liability or warranty for this information). This care instruction is for use with your licensed healthcare professional. If you have questions about a medical condition or this instruction, always ask your healthcare professional. [address] any warranty or liability for your use of this information. Content Version: 4.4.23031; Last Revised: January 11, 2010  PROPER SLEEP HYGIENE    What to avoid  Do not have drinks with caffeine, such as coffee or black tea, for 8 hours before bed. Do not smoke or use other types of tobacco near bedtime. Nicotine is a stimulant and can keep you awake. Avoid drinking alcohol late in the evening, because it can cause you to wake in the middle of the night. Do not eat a big meal close to bedtime. If you are hungry, eat a light snack. Do not drink a lot of water close to bedtime, because the need to urinate may wake you up during the night. Do not read or watch TV in bed. Use the bed only for sleeping and sexual activity. What to try  Go to bed at the same time every night, and wake up at the same time every morning. Do not take naps during the day. Keep your bedroom quiet, dark, and cool. Get regular exercise, but not within 3 to 4 hours of your bedtime. .  Sleep on a comfortable pillow and mattress.   If watching the clock makes you anxious, turn it facing away from you so you cannot see the time. If you worry when you lie down, start a worry book. Well before bedtime, write down your worries, and then set the book and your concerns aside. Try meditation or other relaxation techniques before you go to bed. If you cannot fall asleep, get up and go to another room until you feel sleepy. Do something relaxing. Repeat your bedtime routine before you go to bed again. Make your house quiet and calm about an hour before bedtime. Turn down the lights, turn off the TV, log off the computer, and turn down the volume on music. This can help you relax after a busy day. Drowsy Driving: The [employer] cites drowsiness as a causing factor in more than 889,772 police reported crashes annually, resulting in 76,000 injuries and 1,500 deaths. Other surveys suggest 55% of people polled have driven while drowsy in the past year, 23% had fallen asleep but not crashed, 3% crashed, and 2% had and accident due to drowsy driving. Who is at risk? Young Drivers: One study of drowsy driving accidents states that 55% of the drivers were under 25 years. Of those, 75% were male. Shift Workers and Travelers: People who work overnight or travel across time zones frequently are at higher risk of experiencing Circadian Rhythm Disorders. They are trying to work and function when their body is programed to sleep. Sleep Deprived: Lack of sleep has a serious impact on your ability to pay attention or focus on a task. Consistently getting less than the average of 8 hours your body needs creates partial or cumulative sleep deprivation. Untreated Sleep Disorders: Sleep Apnea, Narcolepsy, R.L.S., and other sleep disorders (untreated) prevent a person from getting enough restful sleep. This leads to excessive daytime sleepiness and increases the risk for drowsy driving accidents by up to 7 times.   Medications / Alcohol: Even over the counter medications can cause drowsiness. Medications that impair a drivers attention should have a warning label. Alcohol naturally makes you sleepy and on its own can cause accidents. Combined with excessive drowsiness its effects are amplified. Signs of Drowsy Driving:   * You don't remember driving the last few miles   * You may drift out of your lui   * You are unable to focus and your thoughts wander   * You may yawn more often than normal   * You have difficulty keeping your eyes open / nodding off   * Missing traffic signs, speeding, or tailgating  Prevention-   Good sleep hygiene, lifestyle and behavioral choices have the most impact on drowsy driving. There is no substitute for sleep and the average person requires 8 hours nightly. If you find yourself driving drowsy, stop and sleep. Consider the sleep hygiene tips provided during your visit as well. Medication Refill Policy: Refills for all medications require 1 week advance notice. Please have your pharmacy fax a refill request. We are unable to fax, or call in \"controled substance\" medications and you will need to pick these prescriptions up from our office. [email or web address] Activation    Thank you for requesting access to check24. Please follow the instructions below to securely access and download your online medical record. check24 allows you to send messages to your doctor, view your test results, renew your prescriptions, schedule appointments, and more. How Do I Sign Up? In your internet browser, go to [email or web address] ([email or web address]. Click on the First Time User? Click Here link in the Sign In box. You will see the New Member Sign Up page. Enter your check24 Access Code exactly as it appears below. You will not need to use this code after youve completed the sign-up process. If you do not sign up before the expiration date, you must request a new code. [email or web address] Access Code:  Activation code not generated  Current [email or web address] Status: Active (This is the date your [facility] access code will )    Enter the last four digits of your Social Security Number (xxxx) and Date of Birth (mm/dd/yyyy) as indicated and click Submit. You will be taken to the next sign-up page. Create a [facility] ID. This will be your [facility] login ID and cannot be changed, so think of one that is secure and easy to remember. Create a [facility] password. You can change your password at any time. Enter your Password Reset Question and Answer. This can be used at a later time if you forget your password. Enter your e-mail address. You will receive e-mail notification when new information is available in [address] Click Sign Up. You can now view and download portions of your medical record. Click the [employer] link to download a portable copy of your medical information. Additional Information    If you have questions, please call [phone]. Remember, [facility] is NOT to be used for urgent needs. For medical emergencies, dial 911.

## 2022-10-19 DIAGNOSIS — I10 ESSENTIAL HYPERTENSION: ICD-10-CM

## 2022-10-19 DIAGNOSIS — E03.9 ACQUIRED HYPOTHYROIDISM: ICD-10-CM

## 2022-10-19 RX ORDER — LEVOTHYROXINE SODIUM 75 UG/1
75 TABLET ORAL
Qty: 90 TABLET | Refills: 1 | Status: SHIPPED | OUTPATIENT
Start: 2022-10-19

## 2022-10-19 RX ORDER — AMLODIPINE BESYLATE 5 MG/1
5 TABLET ORAL DAILY
Qty: 90 TABLET | Refills: 2 | Status: SHIPPED | OUTPATIENT
Start: 2022-10-19

## 2023-02-03 ENCOUNTER — TRANSCRIBE ORDER (OUTPATIENT)
Dept: SCHEDULING | Age: 68
End: 2023-02-03

## 2023-02-03 DIAGNOSIS — Z12.31 SCREENING MAMMOGRAM FOR HIGH-RISK PATIENT: Primary | ICD-10-CM

## 2023-02-11 DIAGNOSIS — E78.5 HYPERLIPIDEMIA LDL GOAL <100: ICD-10-CM

## 2023-02-11 RX ORDER — PRAVASTATIN SODIUM 40 MG/1
40 TABLET ORAL
Qty: 90 TABLET | Refills: 1 | Status: SHIPPED | OUTPATIENT
Start: 2023-02-11

## 2023-02-11 RX ORDER — HYDROCHLOROTHIAZIDE 12.5 MG/1
TABLET ORAL
Qty: 90 TABLET | Refills: 1 | Status: SHIPPED | OUTPATIENT
Start: 2023-02-11

## 2023-04-08 ENCOUNTER — HOSPITAL ENCOUNTER (OUTPATIENT)
Dept: MAMMOGRAPHY | Age: 68
End: 2023-04-08
Attending: OBSTETRICS & GYNECOLOGY
Payer: MEDICARE

## 2023-04-10 NOTE — PROGRESS NOTES
Please call patient to let her know that her recent mammogram did show focal asymmetry in the right breast.  There was not enough on imaging to fully evaluate therefore it is recommended for a diagnostic mammogram of the right breast and possible ultrasound. The breast center was going to call the patient. Therefore please see if she has been contacted about setting up these additional imaging studies up as it is important this gets done.

## 2023-04-17 ENCOUNTER — HOSPITAL ENCOUNTER (OUTPATIENT)
Dept: MAMMOGRAPHY | Age: 68
Discharge: HOME | End: 2023-04-17
Attending: STUDENT IN AN ORGANIZED HEALTH CARE EDUCATION/TRAINING PROGRAM
Payer: MEDICARE

## 2023-04-17 DIAGNOSIS — R92.8 ABNORMAL MAMMOGRAM: ICD-10-CM

## 2023-04-17 PROCEDURE — 76642 ULTRASOUND BREAST LIMITED: CPT

## 2023-04-17 PROCEDURE — 77065 DX MAMMO INCL CAD UNI: CPT

## 2023-04-21 ENCOUNTER — OFFICE VISIT (OUTPATIENT)
Dept: FAMILY MEDICINE CLINIC | Age: 68
End: 2023-04-21
Payer: MEDICARE

## 2023-04-21 VITALS
TEMPERATURE: 97.8 F | WEIGHT: 209 LBS | DIASTOLIC BLOOD PRESSURE: 65 MMHG | BODY MASS INDEX: 33.59 KG/M2 | OXYGEN SATURATION: 96 % | RESPIRATION RATE: 16 BRPM | SYSTOLIC BLOOD PRESSURE: 112 MMHG | HEART RATE: 68 BPM | HEIGHT: 66 IN

## 2023-04-21 DIAGNOSIS — E03.9 ACQUIRED HYPOTHYROIDISM: ICD-10-CM

## 2023-04-21 DIAGNOSIS — E04.2 MULTINODULAR GOITER: ICD-10-CM

## 2023-04-21 DIAGNOSIS — K59.00 CONSTIPATION, UNSPECIFIED CONSTIPATION TYPE: ICD-10-CM

## 2023-04-21 DIAGNOSIS — E78.2 MIXED HYPERLIPIDEMIA: ICD-10-CM

## 2023-04-21 DIAGNOSIS — I10 PRIMARY HYPERTENSION: Primary | ICD-10-CM

## 2023-04-21 DIAGNOSIS — R73.03 PREDIABETES: ICD-10-CM

## 2023-04-21 PROCEDURE — 1123F ACP DISCUSS/DSCN MKR DOCD: CPT | Performed by: STUDENT IN AN ORGANIZED HEALTH CARE EDUCATION/TRAINING PROGRAM

## 2023-04-21 PROCEDURE — 3078F DIAST BP <80 MM HG: CPT | Performed by: STUDENT IN AN ORGANIZED HEALTH CARE EDUCATION/TRAINING PROGRAM

## 2023-04-21 PROCEDURE — 99204 OFFICE O/P NEW MOD 45 MIN: CPT | Performed by: STUDENT IN AN ORGANIZED HEALTH CARE EDUCATION/TRAINING PROGRAM

## 2023-04-21 PROCEDURE — G8417 CALC BMI ABV UP PARAM F/U: HCPCS | Performed by: STUDENT IN AN ORGANIZED HEALTH CARE EDUCATION/TRAINING PROGRAM

## 2023-04-21 PROCEDURE — G8510 SCR DEP NEG, NO PLAN REQD: HCPCS | Performed by: STUDENT IN AN ORGANIZED HEALTH CARE EDUCATION/TRAINING PROGRAM

## 2023-04-21 PROCEDURE — 3074F SYST BP LT 130 MM HG: CPT | Performed by: STUDENT IN AN ORGANIZED HEALTH CARE EDUCATION/TRAINING PROGRAM

## 2023-04-21 PROCEDURE — G8399 PT W/DXA RESULTS DOCUMENT: HCPCS | Performed by: STUDENT IN AN ORGANIZED HEALTH CARE EDUCATION/TRAINING PROGRAM

## 2023-04-21 PROCEDURE — G8427 DOCREV CUR MEDS BY ELIG CLIN: HCPCS | Performed by: STUDENT IN AN ORGANIZED HEALTH CARE EDUCATION/TRAINING PROGRAM

## 2023-04-21 PROCEDURE — G9899 SCRN MAM PERF RSLTS DOC: HCPCS | Performed by: STUDENT IN AN ORGANIZED HEALTH CARE EDUCATION/TRAINING PROGRAM

## 2023-04-21 PROCEDURE — 3017F COLORECTAL CA SCREEN DOC REV: CPT | Performed by: STUDENT IN AN ORGANIZED HEALTH CARE EDUCATION/TRAINING PROGRAM

## 2023-04-21 PROCEDURE — 1090F PRES/ABSN URINE INCON ASSESS: CPT | Performed by: STUDENT IN AN ORGANIZED HEALTH CARE EDUCATION/TRAINING PROGRAM

## 2023-04-21 PROCEDURE — G8536 NO DOC ELDER MAL SCRN: HCPCS | Performed by: STUDENT IN AN ORGANIZED HEALTH CARE EDUCATION/TRAINING PROGRAM

## 2023-04-21 PROCEDURE — 1101F PT FALLS ASSESS-DOCD LE1/YR: CPT | Performed by: STUDENT IN AN ORGANIZED HEALTH CARE EDUCATION/TRAINING PROGRAM

## 2023-04-21 NOTE — PROGRESS NOTES
Assessment/Plan:     Diagnoses and all orders for this visit:    1. Primary hypertension  -     MICROALBUMIN, UR, RAND W/ MICROALB/CREAT RATIO; Future  -     METABOLIC PANEL, COMPREHENSIVE; Future  -     CBC W/O DIFF; Future  -Chronic. Well-controlled. -Continue on current dosage of hydrochlorothiazide and amlodipine  -Check lab work today    2. Mixed hyperlipidemia  -     LIPID PANEL; Future  -Chronic. Presumed stable. -Check lipid panel and continue on pravastatin daily    3. Prediabetes  -     HEMOGLOBIN A1C WITH EAG; Future  -History of prediabetes. Repeat A1c today  -Discussed working on dietary lifestyle modifications    4. Acquired hypothyroidism  -     TSH 3RD GENERATION; Future  -Chronic. Presumed stable. -Repeat TSH today  -Continue on current dosage of levothyroxine    5. Multinodular goiter  -     US THYROID/PARATHYROID/SOFT TISS; Future  -History of multinodular goiter. Has not been evaluated since 2017  -Repeat ultrasound of thyroid.  -Pending results consider possible endocrinology referral    6. Constipation, unspecified constipation type  -Chronic. Intermittent. -Recommend trying MiraLAX as needed  -Return precautions discussed      Follow-up and Dispositions    Return in about 6 months (around 10/21/2023), or if symptoms worsen or fail to improve, for schedule Intro to Medicare wellness visit . Discussed expected course/resolution/complications of diagnosis in detail with patient. Medication risks/benefits/costs/interactions/alternatives discussed with patient. Pt expressed understanding with the diagnosis and plan. Subjective:      Ann Cottrell is a 74 y.o. female who presents for had concerns including New Patient (/) and Establish Care.      Current Outpatient Medications   Medication Sig Dispense Refill    hydroCHLOROthiazide (HYDRODIURIL) 12.5 mg tablet TAKE 1 TABLET BY MOUTH EVERY DAY 90 Tablet 1    pravastatin (PRAVACHOL) 40 mg tablet TAKE 1 TABLET BY MOUTH NIGHTLY 90 Tablet 1    levothyroxine (SYNTHROID) 75 mcg tablet Take 1 Tablet by mouth Daily (before breakfast). 90 Tablet 1    amLODIPine (NORVASC) 5 mg tablet Take 1 Tablet by mouth daily. 90 Tablet 2    estradioL (EstroGel) 1.25 gram/actuation glpm by TransDERmal route. No Known Allergies  Past Medical History:   Diagnosis Date    Hypercholesterolemia     Hypertension     Menopause     Thyroid disease      Past Surgical History:   Procedure Laterality Date    HX CYST INCISION AND DRAINAGE Left 2010    benign    HX FRACTURE TX Right     Right hand    HX GYN      tubal ligation.  Fibroid removal.    HX HYSTERECTOMY  2016    HX WISDOM TEETH EXTRACTION       Family History   Problem Relation Age of Onset    Hypertension Mother     Cancer Father         Lung cancer    Diabetes Brother     Heart Attack Brother     Diabetes Brother         pre DM    Heart Attack Brother     Breast Cancer Cousin      Social History     Socioeconomic History    Marital status:      Spouse name: Not on file    Number of children: Not on file    Years of education: Not on file    Highest education level: Not on file   Occupational History    Not on file   Tobacco Use    Smoking status: Former     Packs/day: 0.50     Years: 20.00     Pack years: 10.00     Types: Cigarettes     Start date: 10/15/1979     Quit date: 10/15/1999     Years since quittin.5    Smokeless tobacco: Never   Vaping Use    Vaping Use: Never used   Substance and Sexual Activity    Alcohol use: Yes     Comment: social    Drug use: No    Sexual activity: Not Currently   Other Topics Concern    Not on file   Social History Narrative    Not on file     Social Determinants of Health     Financial Resource Strain: Low Risk     Difficulty of Paying Living Expenses: Not hard at all   Food Insecurity: No Food Insecurity    Worried About Running Out of Food in the Last Year: Never true    [address] in the Last Year: Never true   Transportation Needs: No Transportation Needs    Lack of Transportation (Medical): No    Lack of Transportation (Non-Medical): No   Physical Activity: Sufficiently Active    Days of Exercise per Week: 5 days    Minutes of Exercise per Session: 60 min   Stress: Not on file   Social Connections: Not on file   Intimate Partner Violence: Not At Risk    Fear of Current or Ex-Partner: No    Emotionally Abused: No    Physically Abused: No    Sexually Abused: No   Housing Stability: Unknown    Unable to Pay for Housing in the Last Year: No    Number of Places Lived in the Last Year: Not on file    Unstable Housing in the Last Year: No       Patient is a 24-year-old female presents to the office today to establish care and follow-up of chronic conditions. Patient has a history of hypertension that is currently well controlled on hydrochlorothiazide and amlodipine. She states that her home blood pressure readings are usually between 393-533 systolic and 04L diastolic. She also has a history of hypercholesterolemia and has been well controlled on pravastatin. Patient states she has a history of a multinodular goiter and hypothyroidism. It has been sometime since he had this evaluated and does not currently follow with endocrinology. Of Note she does follow with GYN and was last seen in January of this year. Finally patient does have 1 concerns of intermittent constipation. She states this will occur a couple times per month. She will occasionally have to strain to have a bowel movement. Has not tried anything for management. ROS:   Review of Systems   Constitutional:  Negative for chills and fever. HENT:  Negative for congestion. Respiratory:  Negative for cough and shortness of breath. Cardiovascular:  Negative for chest pain and leg swelling. Gastrointestinal:  Positive for constipation. Negative for abdominal pain, nausea and vomiting. Neurological:  Negative for dizziness, tingling, weakness and headaches.      Objective: Visit Vitals  /65 (BP 1 Location: Left upper arm, BP Patient Position: Sitting, BP Cuff Size: Adult)   Pulse 68   Temp 97.8 °F (36.6 °C) (Skin)   Resp 16   Ht 5' 6\" (1.676 m)   Wt 209 lb (94.8 kg)   LMP 11/08/2010   SpO2 96%   BMI 33.73 kg/m²         Vitals and Nurse Documentation reviewed. Physical Exam  Constitutional:       General: She is not in acute distress. Appearance: Normal appearance. She is obese. She is not toxic-appearing. HENT:      Head: Normocephalic and atraumatic. Eyes:      Conjunctiva/sclera: Conjunctivae normal.   Cardiovascular:      Rate and Rhythm: Normal rate and regular rhythm. Pulses: Normal pulses. Heart sounds: Normal heart sounds. No murmur heard. No gallop. Pulmonary:      Effort: Pulmonary effort is normal. No respiratory distress. Breath sounds: Normal breath sounds. No wheezing or rhonchi. Abdominal:      General: Bowel sounds are normal. There is no distension. Palpations: Abdomen is soft. Tenderness: There is no abdominal tenderness. There is no guarding. Musculoskeletal:      Cervical back: Neck supple. Right lower leg: No edema. Left lower leg: No edema. Neurological:      Mental Status: She is alert and oriented to person, place, and time.       Gait: Gait normal.

## 2023-04-21 NOTE — PROGRESS NOTES
1. Have you been to the ER, urgent care clinic since your last visit? Hospitalized since your last visit? No    2. Have you seen or consulted any other health care providers outside of the [address] [place] [address] since your last visit? Include any pap smears or colon screening. No      Chief Complaint   Patient presents with    New Patient           [address] Maintenance Due   Topic Date Due    COVID-19 Vaccine (4 - Booster for Moderna series) 12/20/2021    Medicare Yearly Exam  Never done     3 most recent PHQ Screens 4/21/2023   Little interest or pleasure in doing things Not at all   Feeling down, depressed, irritable, or hopeless Not at all   Total Score PHQ 2 0   Trouble falling or staying asleep, or sleeping too much Not at all   Feeling tired or having little energy Not at all   Poor appetite, weight loss, or overeating Not at all   Feeling bad about yourself - or that you are a failure or have let yourself or your family down Not at all   Trouble concentrating on things such as school, work, reading, or watching TV Not at all   Moving or speaking so slowly that other people could have noticed; or the opposite being so fidgety that others notice Not at all   Thoughts of being better off dead, or hurting yourself in some way Not at all   PHQ 9 Score 0   How difficult have these problems made it for you to do your work, take care of your home and get along with others Not difficult at all     Abuse Screening Questionnaire 4/21/2023   Do you ever feel afraid of your partner? N   Are you in a relationship with someone who physically or mentally threatens you? N   Is it safe for you to go home?  Y     Learning Assessment 6/21/2019   PRIMARY LEARNER Patient   HIGHEST LEVEL OF EDUCATION - PRIMARY LEARNER  2 YEARS OF COLLEGE   BARRIERS PRIMARY LEARNER NONE   CO-LEARNER CAREGIVER No   PRIMARY LANGUAGE ENGLISH   LEARNER PREFERENCE PRIMARY READING     DEMONSTRATION   ANSWERED BY patient   RELATIONSHIP SELF   Visit Vitals  /65 (BP 1 Location: Left upper arm, BP Patient Position: Sitting, BP Cuff Size: Adult)   Pulse 68   Temp 97.8 °F (36.6 °C) (Skin)   Resp 16   Ht 5' 6\" (1.676 m)   Wt 209 lb (94.8 kg)   SpO2 96%   BMI 33.73 kg/m²

## 2023-04-22 DIAGNOSIS — Z12.31 SCREENING MAMMOGRAM FOR HIGH-RISK PATIENT: Primary | ICD-10-CM

## 2023-04-22 LAB
ALBUMIN SERPL-MCNC: 3.7 G/DL (ref 3.5–5)
ALBUMIN/GLOB SERPL: 1.2 (ref 1.1–2.2)
ALP SERPL-CCNC: 132 U/L (ref 45–117)
ALT SERPL-CCNC: 30 U/L (ref 12–78)
ANION GAP SERPL CALC-SCNC: 7 MMOL/L (ref 5–15)
AST SERPL-CCNC: 22 U/L (ref 15–37)
BILIRUB SERPL-MCNC: 0.2 MG/DL (ref 0.2–1)
BUN SERPL-MCNC: 9 MG/DL (ref 6–20)
BUN/CREAT SERPL: 9 (ref 12–20)
CALCIUM SERPL-MCNC: 9.2 MG/DL (ref 8.5–10.1)
CHLORIDE SERPL-SCNC: 108 MMOL/L (ref 97–108)
CHOLEST SERPL-MCNC: 163 MG/DL
CO2 SERPL-SCNC: 27 MMOL/L (ref 21–32)
CREAT SERPL-MCNC: 1 MG/DL (ref 0.55–1.02)
CREAT UR-MCNC: 229 MG/DL
ERYTHROCYTE [DISTWIDTH] IN BLOOD BY AUTOMATED COUNT: 13.6 % (ref 11.5–14.5)
EST. AVERAGE GLUCOSE BLD GHB EST-MCNC: 120 MG/DL
GLOBULIN SER CALC-MCNC: 3.2 G/DL (ref 2–4)
GLUCOSE SERPL-MCNC: 106 MG/DL (ref 65–100)
HBA1C MFR BLD: 5.8 % (ref 4–5.6)
HCT VFR BLD AUTO: 43 % (ref 35–47)
HDLC SERPL-MCNC: 52 MG/DL
HDLC SERPL: 3.1 (ref 0–5)
HGB BLD-MCNC: 13.7 G/DL (ref 11.5–16)
LDLC SERPL CALC-MCNC: 90.6 MG/DL (ref 0–100)
MCH RBC QN AUTO: 30.3 PG (ref 26–34)
MCHC RBC AUTO-ENTMCNC: 31.9 G/DL (ref 30–36.5)
MCV RBC AUTO: 95.1 FL (ref 80–99)
MICROALBUMIN UR-MCNC: 0.84 MG/DL
MICROALBUMIN/CREAT UR-RTO: 4 MG/G (ref 0–30)
NRBC # BLD: 0 K/UL (ref 0–0.01)
NRBC BLD-RTO: 0 PER 100 WBC
PLATELET # BLD AUTO: 271 K/UL (ref 150–400)
PMV BLD AUTO: 11.1 FL (ref 8.9–12.9)
POTASSIUM SERPL-SCNC: 3.8 MMOL/L (ref 3.5–5.1)
PROT SERPL-MCNC: 6.9 G/DL (ref 6.4–8.2)
RBC # BLD AUTO: 4.52 M/UL (ref 3.8–5.2)
SODIUM SERPL-SCNC: 142 MMOL/L (ref 136–145)
TRIGL SERPL-MCNC: 102 MG/DL (ref ?–150)
TSH SERPL DL<=0.05 MIU/L-ACNC: 1.03 UIU/ML (ref 0.36–3.74)
VLDLC SERPL CALC-MCNC: 20.4 MG/DL
WBC # BLD AUTO: 5.7 K/UL (ref 3.6–11)

## 2023-05-05 ENCOUNTER — HOSPITAL ENCOUNTER (OUTPATIENT)
Dept: ULTRASOUND IMAGING | Age: 68
End: 2023-05-05
Attending: STUDENT IN AN ORGANIZED HEALTH CARE EDUCATION/TRAINING PROGRAM
Payer: MEDICARE

## 2023-05-05 DIAGNOSIS — E04.2 MULTINODULAR GOITER: ICD-10-CM

## 2023-05-05 PROCEDURE — 76536 US EXAM OF HEAD AND NECK: CPT

## 2023-05-09 ENCOUNTER — TELEPHONE (OUTPATIENT)
Dept: FAMILY MEDICINE CLINIC | Age: 68
End: 2023-05-09

## 2023-05-09 DIAGNOSIS — E04.2 MULTIPLE THYROID NODULES: Primary | ICD-10-CM

## 2023-05-16 RX ORDER — LEVOTHYROXINE SODIUM 0.07 MG/1
TABLET ORAL
Qty: 90 TABLET | Refills: 1 | Status: SHIPPED | OUTPATIENT
Start: 2023-05-16

## 2023-05-24 ENCOUNTER — OFFICE VISIT (OUTPATIENT)
Facility: CLINIC | Age: 68
End: 2023-05-24
Payer: COMMERCIAL

## 2023-05-24 VITALS
SYSTOLIC BLOOD PRESSURE: 125 MMHG | DIASTOLIC BLOOD PRESSURE: 67 MMHG | BODY MASS INDEX: 33.43 KG/M2 | OXYGEN SATURATION: 99 % | TEMPERATURE: 96.9 F | HEIGHT: 66 IN | RESPIRATION RATE: 16 BRPM | HEART RATE: 71 BPM | WEIGHT: 208 LBS

## 2023-05-24 DIAGNOSIS — Z11.59 NEED FOR HEPATITIS C SCREENING TEST: ICD-10-CM

## 2023-05-24 DIAGNOSIS — Z00.00 WELCOME TO MEDICARE PREVENTIVE VISIT: Primary | ICD-10-CM

## 2023-05-24 DIAGNOSIS — Z72.89 OTHER PROBLEMS RELATED TO LIFESTYLE: ICD-10-CM

## 2023-05-24 DIAGNOSIS — M25.522 LEFT ELBOW PAIN: ICD-10-CM

## 2023-05-24 PROCEDURE — 1123F ACP DISCUSS/DSCN MKR DOCD: CPT | Performed by: STUDENT IN AN ORGANIZED HEALTH CARE EDUCATION/TRAINING PROGRAM

## 2023-05-24 PROCEDURE — 99213 OFFICE O/P EST LOW 20 MIN: CPT | Performed by: STUDENT IN AN ORGANIZED HEALTH CARE EDUCATION/TRAINING PROGRAM

## 2023-05-24 PROCEDURE — G0402 INITIAL PREVENTIVE EXAM: HCPCS | Performed by: STUDENT IN AN ORGANIZED HEALTH CARE EDUCATION/TRAINING PROGRAM

## 2023-05-24 PROCEDURE — G0403 EKG FOR INITIAL PREVENT EXAM: HCPCS | Performed by: STUDENT IN AN ORGANIZED HEALTH CARE EDUCATION/TRAINING PROGRAM

## 2023-05-24 PROCEDURE — 3074F SYST BP LT 130 MM HG: CPT | Performed by: STUDENT IN AN ORGANIZED HEALTH CARE EDUCATION/TRAINING PROGRAM

## 2023-05-24 PROCEDURE — 3078F DIAST BP <80 MM HG: CPT | Performed by: STUDENT IN AN ORGANIZED HEALTH CARE EDUCATION/TRAINING PROGRAM

## 2023-05-24 ASSESSMENT — PATIENT HEALTH QUESTIONNAIRE - PHQ9
2. FEELING DOWN, DEPRESSED OR HOPELESS: 0
SUM OF ALL RESPONSES TO PHQ QUESTIONS 1-9: 0
SUM OF ALL RESPONSES TO PHQ9 QUESTIONS 1 & 2: 0
SUM OF ALL RESPONSES TO PHQ QUESTIONS 1-9: 0
1. LITTLE INTEREST OR PLEASURE IN DOING THINGS: 0
SUM OF ALL RESPONSES TO PHQ QUESTIONS 1-9: 0
SUM OF ALL RESPONSES TO PHQ QUESTIONS 1-9: 0

## 2023-05-24 ASSESSMENT — LIFESTYLE VARIABLES
HOW OFTEN DO YOU HAVE A DRINK CONTAINING ALCOHOL: 2-3 TIMES A WEEK
HOW MANY STANDARD DRINKS CONTAINING ALCOHOL DO YOU HAVE ON A TYPICAL DAY: 1 OR 2

## 2023-05-24 NOTE — PROGRESS NOTES
Medicare Annual Wellness Visit    Reinier Marcelo is here for Medicare AWV and Blood Work    Assessment & Plan     Welcome to Medicare preventive visit  -     EKG - Welcome to [facility] ([facility])  -Medicare wellness questionnaire completed and reviewed in office today  -Patient up-to-date on mammogram.  No longer requires Pap smears.  -Up-to-date on colonoscopy. -Up-to-date on all immunizations.  -Baseline EKG obtained in office today. Normal sinus rhythm with PACs    Left elbow pain  -     diclofenac sodium (VOLTAREN) 1 % GEL; Apply 2 g topically 4 times daily as needed for Pain Apply to affected joint only, Topical, 4 TIMES DAILY PRN Starting Wed 5/24/2023, Disp-100 g, R-0, Normal  -Acute. Likely secondary to working in her garden  -Try topical Voltaren gel as needed  -Return precautions discussed    Need for hepatitis C screening test  -     Hepatitis C Antibody; Future    Other problems related to lifestyle  -     Hepatitis C Antibody; Future      Recommendations for Preventive Services Due: see orders and patient instructions/AVS.  Recommended screening schedule for the next 5-10 years is provided to the patient in written form: see Patient Instructions/AVS.     No follow-ups on file. Subjective   The following acute and/or chronic problems were also addressed today:    Left elbow pain: Patient has been working in her garden more frequently, almost 6 days/week. She states recently she has been having left-sided elbow pain at the joint. She denies any numbness, tingling or weakness of the extremity. She also denies any redness or swelling of the joint. She has been using topical creams it does seem to provide benefit. She denies any injury or trauma. Remainder of RO negative. Patient's complete Health Risk Assessment and screening values have been reviewed and are found in Flowsheets.  The following problems were reviewed today and where indicated follow up appointments were made and/or referrals

## 2023-05-25 LAB — HCV AB SERPL QL IA: NONREACTIVE

## 2023-08-04 DIAGNOSIS — I10 ESSENTIAL (PRIMARY) HYPERTENSION: ICD-10-CM

## 2023-08-04 RX ORDER — AMLODIPINE BESYLATE 5 MG/1
TABLET ORAL
Qty: 90 TABLET | Refills: 1 | Status: SHIPPED | OUTPATIENT
Start: 2023-08-04

## 2023-08-11 DIAGNOSIS — E78.5 HYPERLIPIDEMIA, UNSPECIFIED: ICD-10-CM

## 2023-08-11 RX ORDER — PRAVASTATIN SODIUM 40 MG
TABLET ORAL
Qty: 90 TABLET | Refills: 1 | OUTPATIENT
Start: 2023-08-11

## 2023-08-11 RX ORDER — HYDROCHLOROTHIAZIDE 12.5 MG/1
TABLET ORAL
Qty: 90 TABLET | Refills: 1 | OUTPATIENT
Start: 2023-08-11

## 2023-08-11 RX ORDER — PRAVASTATIN SODIUM 40 MG
40 TABLET ORAL DAILY
Qty: 90 TABLET | Refills: 1 | Status: SHIPPED | OUTPATIENT
Start: 2023-08-11

## 2023-08-11 NOTE — TELEPHONE ENCOUNTER
Requested Prescriptions     Pending Prescriptions Disp Refills    hydroCHLOROthiazide (HYDRODIURIL) 12.5 MG tablet [Pharmacy Med Name: HYDROCHLOROTHIAZIDE 12.5 MG TB] 90 tablet 1     Sig: TAKE 1 TABLET BY MOUTH EVERY DAY    pravastatin (PRAVACHOL) 40 MG tablet [Pharmacy Med Name: PRAVASTATIN SODIUM 40 MG TAB] 90 tablet 1     Sig: TAKE 1 TABLET BY MOUTH NIGHTLY         [facility]/pharmacy #[id number] [address] [facility] [phone] [facility] [phone]  [address] [place] [address]  Phone: [phone] Fax: [phone]       Last appt 8/18/2022      Future Appointments   Date Time Provider [address]   10/12/2023  9:30 AM [facility] [place] [facility]   10/20/2023  9:10 AM Chad Cervantes MD RDE [facility] [place] BS AMB   10/24/2023  8:20 AM Adria Collado MD [facility] BS AMB

## 2023-09-19 RX ORDER — HYDROCHLOROTHIAZIDE 12.5 MG/1
TABLET ORAL
Qty: 90 TABLET | Refills: 0 | Status: SHIPPED | OUTPATIENT
Start: 2023-09-19

## 2023-09-19 RX ORDER — LEVOTHYROXINE SODIUM 0.07 MG/1
TABLET ORAL
Qty: 90 TABLET | Refills: 1 | Status: SHIPPED | OUTPATIENT
Start: 2023-09-19 | End: 2023-10-20 | Stop reason: ALTCHOICE

## 2023-10-06 ENCOUNTER — CLINICAL DOCUMENTATION (OUTPATIENT)
Age: 68
End: 2023-10-06

## 2023-10-06 ENCOUNTER — TELEPHONE (OUTPATIENT)
Age: 68
End: 2023-10-06

## 2023-10-06 DIAGNOSIS — G47.33 OBSTRUCTIVE SLEEP APNEA (ADULT) (PEDIATRIC): Primary | ICD-10-CM

## 2023-10-06 NOTE — TELEPHONE ENCOUNTER
Patient called to request order for travel cpap device. I asked her if she wanted to wait until her next annual follow up is scheduled for 10/12/23. She said should like the order now. For MA: Order need to be emailed to patient.

## 2023-10-09 ASSESSMENT — SLEEP AND FATIGUE QUESTIONNAIRES
HOW LIKELY ARE YOU TO NOD OFF OR FALL ASLEEP WHILE SITTING AND READING: SLIGHT CHANCE OF DOZING
HOW LIKELY ARE YOU TO NOD OFF OR FALL ASLEEP WHEN YOU ARE A PASSENGER IN A CAR FOR AN HOUR WITHOUT A BREAK: WOULD NEVER DOZE
HAS YOUR RELATIONSHIP WITH FAMILY, FRIENDS OR WORK COLLEAGUES BEEN AFFECTED BECAUSE YOU ARE SLEEPY OR TIRED: NO
HOW LIKELY ARE YOU TO NOD OFF OR FALL ASLEEP WHILE SITTING INACTIVE IN A PUBLIC PLACE: 0
FOSQ SCORE: 19
HOW LIKELY ARE YOU TO NOD OFF OR FALL ASLEEP WHEN YOU ARE A PASSENGER IN A CAR FOR AN HOUR WITHOUT A BREAK: 0
HOW LIKELY ARE YOU TO NOD OFF OR FALL ASLEEP WHILE LYING DOWN TO REST IN THE AFTERNOON WHEN CIRCUMSTANCES PERMIT: 1
HOW LIKELY ARE YOU TO NOD OFF OR FALL ASLEEP WHILE WATCHING TV: 1
ESS TOTAL SCORE: 3
DO YOU HAVE DIFFICULTY CONCENTRATING ON THE THINGS YOU DO BECAUSE YOU ARE SLEEPY OR TIRED: YES, A LITTLE
HOW LIKELY ARE YOU TO NOD OFF OR FALL ASLEEP WHILE SITTING QUIETLY AFTER LUNCH WITHOUT ALCOHOL: 0
DO YOU HAVE DIFFICULTY OPERATING A MOTOR VEHICLE FOR SHORT DISTANCES (LESS THAN 100 MILES) BECAUSE YOU BECOME SLEEPY: NO
HOW LIKELY ARE YOU TO NOD OFF OR FALL ASLEEP WHILE WATCHING TV: SLIGHT CHANCE OF DOZING
DO YOU HAVE DIFFICULTY OPERATING A MOTOR VEHICLE FOR LONG DISTANCES (GREATER THAN 100 MILES) BECAUSE YOU BECOME SLEEPY: NO
HOW LIKELY ARE YOU TO NOD OFF OR FALL ASLEEP IN A CAR, WHILE STOPPED FOR A FEW MINUTES IN TRAFFIC: WOULD NEVER DOZE
DO YOU HAVE DIFFICULTY WATCHING A MOVIE OR VIDEO BECAUSE YOU BECOME SLEEPY OR TIRED: YES, A LITTLE
DO YOU HAVE DIFFICULTY BEING AS ACTIVE AS YOU WANT TO BE IN THE MORNING BECAUSE YOU ARE SLEEPY OR TIRED: NO
HOW LIKELY ARE YOU TO NOD OFF OR FALL ASLEEP WHILE SITTING QUIETLY AFTER LUNCH WITHOUT ALCOHOL: WOULD NEVER DOZE
HOW LIKELY ARE YOU TO NOD OFF OR FALL ASLEEP WHILE SITTING AND TALKING TO SOMEONE: 0
HAS YOUR MOOD BEEN AFFECTED BECAUSE YOU ARE SLEEPY OR TIRED: NO
HOW LIKELY ARE YOU TO NOD OFF OR FALL ASLEEP IN A CAR, WHILE STOPPED FOR A FEW MINUTES IN TRAFFIC: 0
HOW LIKELY ARE YOU TO NOD OFF OR FALL ASLEEP WHILE SITTING AND TALKING TO SOMEONE: WOULD NEVER DOZE
HOW LIKELY ARE YOU TO NOD OFF OR FALL ASLEEP WHILE SITTING INACTIVE IN A PUBLIC PLACE: WOULD NEVER DOZE
HOW LIKELY ARE YOU TO NOD OFF OR FALL ASLEEP WHILE LYING DOWN TO REST IN THE AFTERNOON WHEN CIRCUMSTANCES PERMIT: SLIGHT CHANCE OF DOZING
DO YOU GENERALLY HAVE DIFFICULTY REMEMBERING THINGS BECAUSE YOU ARE SLEEPY OR TIRED: NO
HOW LIKELY ARE YOU TO NOD OFF OR FALL ASLEEP WHILE SITTING AND READING: 1
DO YOU HAVE DIFFICULTY BEING AS ACTIVE AS YOU WANT TO BE IN THE EVENING BECAUSE YOU ARE SLEEPY OR TIRED: NO
DO YOU HAVE DIFFICULTY VISITING YOUR FAMILY OR FRIENDS IN THEIR HOME BECAUSE YOU BECOME SLEEPY OR TIRED: NO

## 2023-10-12 ENCOUNTER — OFFICE VISIT (OUTPATIENT)
Age: 68
End: 2023-10-12

## 2023-10-12 ENCOUNTER — TELEPHONE (OUTPATIENT)
Age: 68
End: 2023-10-12

## 2023-10-12 VITALS
BODY MASS INDEX: 33.78 KG/M2 | WEIGHT: 210.2 LBS | DIASTOLIC BLOOD PRESSURE: 74 MMHG | SYSTOLIC BLOOD PRESSURE: 131 MMHG | HEIGHT: 66 IN | OXYGEN SATURATION: 96 % | TEMPERATURE: 98 F | HEART RATE: 70 BPM

## 2023-10-12 DIAGNOSIS — G47.33 OBSTRUCTIVE SLEEP APNEA (ADULT) (PEDIATRIC): Primary | ICD-10-CM

## 2023-10-12 DIAGNOSIS — I10 PRIMARY HYPERTENSION: ICD-10-CM

## 2023-10-12 NOTE — PATIENT INSTRUCTIONS
[address] [facility] Alexa Pierson, [address]  Tel.  [phone]  Fax. [address] [facility], [address] [place] [address]  Tel.  [phone]  Fax. [phone] [address]  Tel.  [phone]  Fax. [phone]     Learning About CPAP for Sleep Apnea  What is CPAP? CPAP is a small machine that you use at home every night while you sleep. It increases air pressure in your throat to keep your airway open. When you have sleep apnea, this can help you sleep better so you feel much better. CPAP stands for \"continuous positive airway pressure. \"  The CPAP machine will have one of the following:  A mask that covers your nose and mouth  Prongs that fit into your nose  A mask that covers your nose only, the most common type. This type is called NCPAP. The N stands for \"nasal.\"  Why is it done? CPAP is usually the best treatment for obstructive sleep apnea. It is the first treatment choice and the most widely used. Your doctor may suggest CPAP if you have: Moderate to severe sleep apnea. Sleep apnea and coronary artery disease (CAD) or heart failure. How does it help? CPAP can help you have more normal sleep, so you feel less sleepy and more alert during the daytime. CPAP may help keep heart failure or other heart problems from getting worse. NCPAP may help lower your blood pressure. If you use CPAP, your bed partner may also sleep better because you are not snoring or restless. What are the side effects? Some people who use CPAP have:  A dry or stuffy nose and a sore throat. Irritated skin on the face. Sore eyes. Bloating. If you have any of these problems, work with your doctor to fix them. Here are some things you can try:  Be sure the mask or nasal prongs fit well. See if your doctor can adjust the pressure of your CPAP. If your nose is dry, try a humidifier.   If your nose is runny or stuffy, try decongestant medicine or a steroid

## 2023-10-12 NOTE — PROGRESS NOTES
[address] [facility].[place], [address]   Tel.  [phone]   Fax. [address] [facility], [address] [place] [address]   Tel.  [phone]   Fax. [phone]  [facility], [address] [facility]   Tel.  [phone]   Fax. [phone]     Tio Shelton (: 1955) is a 79 y.o. female, established patient, seen for positive airway pressure follow-up and evaluation. She was last seen by me on 10/9/2022, previously seen by Dr. Adair Stratton on 2021,prior notes and sleep testing reviewed in detail. In lab sleep test 2021 showed AHI of 15.8/hr with a lowest SaO2 of 77%, duration of SaO2 < 88% 2.6 min. Weight at time of study was 198 pounds. ASSESSMENT/PLAN:   Diagnosis Orders   1. Obstructive sleep apnea (adult) (pediatric)  DME Order for (Specify) as OP    DME Order for (Specify) as OP      2. Primary hypertension        3. Body mass index (BMI) 33.0-33.9, adult             AHI = 15.8(2021). On APAP, Resmed :  10-13 cmH2O. Set up 2021. She is adherent with PAP therapy and PAP continues to benefit patient and remains necessary for control of her sleep apnea. Follow-up and Dispositions    Return in about 1 year (around 10/12/2024) for Annual follow up. Sleep Apnea -   Sleep apnea treatment is causing negative side effects. Change in treatment plan is needed. Continue on current pressures, change EPR to on, 3 full time. Changes made by provider in [employer] system and [place] [facility] [place] Supplies ordered - nasal pillows mask and heated tubing    Orders Placed This Encounter   Procedures    DME Order for (Specify) as OP     Diagnosis: (G47.33) SENA (obstructive sleep apnea)  (primary encounter diagnosis)     Pressure change to EPR on : full time 3. Changes made in sleep lab. LUISANA Mills; NPI: [id number]    Electronically signed.  Date:- 10/12/23    DME Order for (Specify) as OP     Diagnosis: (G47.33) SENA (obstructive

## 2023-10-19 NOTE — PROGRESS NOTES
3.  This nodule appears similar to the superiorly located nodule. It is not identified on the previous study. There is a 0.9 x 1.4 x 0.9 cm hypoechoic, ill-defined nodule in the inferior right thyroid lobe. TIRADS 4. The nodule not definitely identified on the previous study. In the lower left thyroid lobe, there is a 0.9 x 1.1 x 1.0 cm isoechoic, smooth marginated nodule with no echogenic foci. TIRADS 3. This previously measured up to 1.9 cm. It is smaller. Also in the inferior left thyroid lobe, there is a 1.9 x 1.3 x 1.0 cm hypoechoic, ill-defined nodule. TIRADS 4. On the prior study, there was an isoechoic nodule measuring up to 3.3 cm in a similar position. However, the nodule now appears hypoechoic. Lab Results   Component Value Date/Time    TSH 1.03 04/21/2023 09:09 AM    T4FREE 1.31 01/10/2020 10:29 AM       Lab Results   Component Value Date/Time    TSH 1.03 04/21/2023 09:09 AM    TSH 0.477 08/18/2022 03:27 PM    TSH 1.260 01/11/2022 07:31 AM    TSH 1.220 10/12/2021 07:30 AM    TSH 1.180 04/12/2021 07:53 AM    TSH 1.090 10/13/2020 09:05 AM    TSH 4.940 07/24/2020 08:25 AM    TSH 0.244 01/10/2020 10:29 AM    TSH 0.033 10/09/2019 11:57 AM    TSH 1.570 07/29/2019 03:34 PM    TSH 51.000 06/14/2019 02:09 PM        Past Medical History:   Diagnosis Date    Hypercholesterolemia     Hypertension     Menopause     Thyroid disease          Blood pressure 137/71, pulse 66, height 1.676 m (5' 6\"), weight 94.8 kg (209 lb). No flowsheet data found. EXAM:  - not done today     Assessment/Plan:   1. Multiple thyroid nodules  Overall size of gland smaller and several nodules smaller - TSH high when done so after Tx likely gland shrunk some  Only one nodule of concern, left 1.9cm TR4, so FNA ordered    2. Acquired hypothyroidism  Labs good last few years  Switch to brand       Orders Placed This Encounter   Medications    UNITHROID 75 MCG tablet     Sig: One tablet by mouth once a day.   Take in AM with

## 2023-10-20 ENCOUNTER — OFFICE VISIT (OUTPATIENT)
Age: 68
End: 2023-10-20
Payer: COMMERCIAL

## 2023-10-20 VITALS
SYSTOLIC BLOOD PRESSURE: 137 MMHG | DIASTOLIC BLOOD PRESSURE: 71 MMHG | WEIGHT: 209 LBS | HEIGHT: 66 IN | BODY MASS INDEX: 33.59 KG/M2 | HEART RATE: 66 BPM

## 2023-10-20 DIAGNOSIS — E03.9 ACQUIRED HYPOTHYROIDISM: ICD-10-CM

## 2023-10-20 DIAGNOSIS — E04.2 MULTIPLE THYROID NODULES: Primary | ICD-10-CM

## 2023-10-20 PROCEDURE — 99204 OFFICE O/P NEW MOD 45 MIN: CPT | Performed by: INTERNAL MEDICINE

## 2023-10-20 PROCEDURE — 1123F ACP DISCUSS/DSCN MKR DOCD: CPT | Performed by: INTERNAL MEDICINE

## 2023-10-20 PROCEDURE — 3075F SYST BP GE 130 - 139MM HG: CPT | Performed by: INTERNAL MEDICINE

## 2023-10-20 PROCEDURE — 3078F DIAST BP <80 MM HG: CPT | Performed by: INTERNAL MEDICINE

## 2023-10-20 RX ORDER — LEVOTHYROXINE SODIUM 75 UG/1
TABLET ORAL
Qty: 90 TABLET | Refills: 3 | Status: SHIPPED | OUTPATIENT
Start: 2023-10-20

## 2023-10-24 ENCOUNTER — OFFICE VISIT (OUTPATIENT)
Facility: CLINIC | Age: 68
End: 2023-10-24
Payer: COMMERCIAL

## 2023-10-24 VITALS
BODY MASS INDEX: 33.59 KG/M2 | SYSTOLIC BLOOD PRESSURE: 129 MMHG | WEIGHT: 209 LBS | OXYGEN SATURATION: 98 % | DIASTOLIC BLOOD PRESSURE: 77 MMHG | TEMPERATURE: 97.9 F | HEART RATE: 67 BPM | RESPIRATION RATE: 16 BRPM | HEIGHT: 66 IN

## 2023-10-24 DIAGNOSIS — K59.00 CONSTIPATION, UNSPECIFIED CONSTIPATION TYPE: ICD-10-CM

## 2023-10-24 DIAGNOSIS — I10 ESSENTIAL (PRIMARY) HYPERTENSION: Primary | ICD-10-CM

## 2023-10-24 DIAGNOSIS — R74.8 ELEVATED ALKALINE PHOSPHATASE LEVEL: ICD-10-CM

## 2023-10-24 PROCEDURE — 99214 OFFICE O/P EST MOD 30 MIN: CPT | Performed by: STUDENT IN AN ORGANIZED HEALTH CARE EDUCATION/TRAINING PROGRAM

## 2023-10-24 PROCEDURE — 3078F DIAST BP <80 MM HG: CPT | Performed by: STUDENT IN AN ORGANIZED HEALTH CARE EDUCATION/TRAINING PROGRAM

## 2023-10-24 PROCEDURE — 1123F ACP DISCUSS/DSCN MKR DOCD: CPT | Performed by: STUDENT IN AN ORGANIZED HEALTH CARE EDUCATION/TRAINING PROGRAM

## 2023-10-24 PROCEDURE — 3074F SYST BP LT 130 MM HG: CPT | Performed by: STUDENT IN AN ORGANIZED HEALTH CARE EDUCATION/TRAINING PROGRAM

## 2023-10-24 ASSESSMENT — ENCOUNTER SYMPTOMS
CONSTIPATION: 1
BLOOD IN STOOL: 0
NAUSEA: 0
VOMITING: 0
ABDOMINAL PAIN: 0
SHORTNESS OF BREATH: 0
RHINORRHEA: 0
COUGH: 0

## 2023-10-24 NOTE — PROGRESS NOTES
Identified pt with two pt identifiers(name and ). Reviewed record in preparation for visit and have obtained necessary documentation. Chief Complaint   Patient presents with    6 Month Follow-Up        Health Maintenance Due   Topic    COVID-19 Vaccine (4 - Moderna series)       Coordination of Care Questionnaire:  :   1) Have you been to an emergency room, urgent care, or hospitalized since your last visit? If yes, where when, and reason for visit? no      2. Have seen or consulted any other health care provider since your last visit? If yes, where when, and reason for visit?  no        Patient is accompanied by self I have received verbal consent from Janell Ayoub to discuss any/all medical information while they are present in the room.
Transportation (Medical): No     Lack of Transportation (Non-Medical): No   Physical Activity: Sufficiently Active (5/24/2023)    Exercise Vital Sign     Days of Exercise per Week: 6 days     Minutes of Exercise per Session: 100 min   Recent Concern: Physical Activity - Insufficiently Active (4/18/2023)    Exercise Vital Sign     Days of Exercise per Week: 1 day     Minutes of Exercise per Session: 20 min   Stress: Not on file   Social Connections: Not on file   Intimate Partner Violence: Not At Risk (4/18/2023)    Humiliation, Afraid, Rape, and Kick questionnaire     Fear of Current or Ex-Partner: No     Emotionally Abused: No     Physically Abused: No     Sexually Abused: No   Housing Stability: Unknown (4/21/2023)    Housing Stability Vital Sign     Unable to Pay for Housing in the Last Year: No     Number of Places Lived in the Last Year: Not on file     Unstable Housing in the Last Year: No        Patient is a 73-year-old female who presents to the office today for follow-up chronic conditions including hypertension. Patient states her blood pressures have been well controlled on current regimen of hydrochlorothiazide and amlodipine. Her home blood pressure readings are usually 199A systolic and 19D diastolic. Patient is wondering if she would be able to come off of any medication. She is currently followed with endocrinology for acquired hypothyroidism as well as monitoring of thyroid nodules. Previous lab work has also noted some slight elevation in alkaline phosphatase. Finally of note she does endorse some constipation. She notes symptoms are intermittent and not consistent. She is up-to-date on her colonoscopy. She denies any blood in stool. ROS:   Review of Systems   Constitutional:  Negative for chills and fever. HENT:  Negative for rhinorrhea. Respiratory:  Negative for cough and shortness of breath. Cardiovascular:  Negative for chest pain and leg swelling.    Gastrointestinal:

## 2023-12-05 ENCOUNTER — HOSPITAL ENCOUNTER (OUTPATIENT)
Facility: HOSPITAL | Age: 68
Discharge: HOME | End: 2023-12-08
Attending: INTERNAL MEDICINE
Payer: MEDICARE

## 2023-12-05 DIAGNOSIS — E04.2 MULTIPLE THYROID NODULES: ICD-10-CM

## 2023-12-05 PROCEDURE — 88173 CYTOPATH EVAL FNA REPORT: CPT

## 2023-12-05 PROCEDURE — 10005 FNA BX W/US GDN 1ST LES: CPT

## 2023-12-05 PROCEDURE — 88172 CYTP DX EVAL FNA 1ST EA SITE: CPT

## 2023-12-21 DIAGNOSIS — R10.9 INTERMITTENT ABDOMINAL PAIN: ICD-10-CM

## 2023-12-21 LAB
ALBUMIN SERPL-MCNC: 3.5 G/DL (ref 3.5–5)
ALBUMIN/GLOB SERPL: 1.1 (ref 1.1–2.2)
ALP SERPL-CCNC: 124 U/L (ref 45–117)
ALT SERPL-CCNC: 27 U/L (ref 12–78)
ANION GAP SERPL CALC-SCNC: 2 MMOL/L (ref 5–15)
AST SERPL-CCNC: 17 U/L (ref 15–37)
BASOPHILS # BLD: 0 K/UL (ref 0–0.1)
BASOPHILS NFR BLD: 1 % (ref 0–1)
BILIRUB SERPL-MCNC: 0.3 MG/DL (ref 0.2–1)
BUN SERPL-MCNC: 11 MG/DL (ref 6–20)
BUN/CREAT SERPL: 13 (ref 12–20)
CALCIUM SERPL-MCNC: 8.9 MG/DL (ref 8.5–10.1)
CHLORIDE SERPL-SCNC: 107 MMOL/L (ref 97–108)
CO2 SERPL-SCNC: 32 MMOL/L (ref 21–32)
CREAT SERPL-MCNC: 0.84 MG/DL (ref 0.55–1.02)
DIFFERENTIAL METHOD BLD: NORMAL
EOSINOPHIL # BLD: 0.2 K/UL (ref 0–0.4)
EOSINOPHIL NFR BLD: 4 % (ref 0–7)
ERYTHROCYTE [DISTWIDTH] IN BLOOD BY AUTOMATED COUNT: 13.8 % (ref 11.5–14.5)
GLOBULIN SER CALC-MCNC: 3.2 G/DL (ref 2–4)
GLUCOSE SERPL-MCNC: 109 MG/DL (ref 65–100)
HCT VFR BLD AUTO: 39.9 % (ref 35–47)
HGB BLD-MCNC: 12.6 G/DL (ref 11.5–16)
IMM GRANULOCYTES # BLD AUTO: 0 K/UL (ref 0–0.04)
IMM GRANULOCYTES NFR BLD AUTO: 0 % (ref 0–0.5)
LIPASE SERPL-CCNC: 24 U/L (ref 13–75)
LYMPHOCYTES # BLD: 2 K/UL (ref 0.8–3.5)
LYMPHOCYTES NFR BLD: 37 % (ref 12–49)
MCH RBC QN AUTO: 29.9 PG (ref 26–34)
MCHC RBC AUTO-ENTMCNC: 31.6 G/DL (ref 30–36.5)
MCV RBC AUTO: 94.5 FL (ref 80–99)
MONOCYTES # BLD: 0.4 K/UL (ref 0–1)
MONOCYTES NFR BLD: 7 % (ref 5–13)
NEUTS SEG # BLD: 2.9 K/UL (ref 1.8–8)
NEUTS SEG NFR BLD: 51 % (ref 32–75)
NRBC # BLD: 0 K/UL (ref 0–0.01)
NRBC BLD-RTO: 0 PER 100 WBC
PLATELET # BLD AUTO: 229 K/UL (ref 150–400)
PMV BLD AUTO: 10.8 FL (ref 8.9–12.9)
POTASSIUM SERPL-SCNC: 3.6 MMOL/L (ref 3.5–5.1)
PROT SERPL-MCNC: 6.7 G/DL (ref 6.4–8.2)
RBC # BLD AUTO: 4.22 M/UL (ref 3.8–5.2)
SODIUM SERPL-SCNC: 141 MMOL/L (ref 136–145)
WBC # BLD AUTO: 5.5 K/UL (ref 3.6–11)

## 2024-01-05 ENCOUNTER — HOSPITAL ENCOUNTER (OUTPATIENT)
Facility: HOSPITAL | Age: 69
Discharge: HOME | End: 2024-01-05
Attending: INTERNAL MEDICINE
Payer: MEDICARE

## 2024-01-05 DIAGNOSIS — E04.2 MULTIPLE THYROID NODULES: ICD-10-CM

## 2024-01-05 PROCEDURE — 88172 CYTP DX EVAL FNA 1ST EA SITE: CPT

## 2024-01-05 PROCEDURE — 10005 FNA BX W/US GDN 1ST LES: CPT

## 2024-01-05 PROCEDURE — 88173 CYTOPATH EVAL FNA REPORT: CPT

## 2024-01-15 ENCOUNTER — HOSPITAL ENCOUNTER (OUTPATIENT)
Facility: HOSPITAL | Age: 69
Discharge: HOME | End: 2024-01-18
Attending: STUDENT IN AN ORGANIZED HEALTH CARE EDUCATION/TRAINING PROGRAM
Payer: MEDICARE

## 2024-01-15 DIAGNOSIS — R19.8 CHANGE IN BOWEL FUNCTION: ICD-10-CM

## 2024-01-15 DIAGNOSIS — R10.9 INTERMITTENT ABDOMINAL PAIN: ICD-10-CM

## 2024-01-15 PROCEDURE — 6360000004 HC RX CONTRAST MEDICATION: Performed by: RADIOLOGY

## 2024-01-15 PROCEDURE — 74177 CT ABD & PELVIS W/CONTRAST: CPT

## 2024-01-15 RX ADMIN — IOPAMIDOL 100 ML: 755 INJECTION, SOLUTION INTRAVENOUS at 09:28

## 2024-01-18 PROBLEM — K76.0 HEPATIC STEATOSIS: Status: ACTIVE | Noted: 2024-01-18

## 2024-01-28 DIAGNOSIS — I10 ESSENTIAL (PRIMARY) HYPERTENSION: ICD-10-CM

## 2024-01-29 DIAGNOSIS — I10 ESSENTIAL (PRIMARY) HYPERTENSION: ICD-10-CM

## 2024-01-29 RX ORDER — AMLODIPINE BESYLATE 5 MG/1
TABLET ORAL
Qty: 90 TABLET | Refills: 1 | OUTPATIENT
Start: 2024-01-29

## 2024-01-29 NOTE — TELEPHONE ENCOUNTER
PCP: Mireille Mccarthy MD    Last appt: [unfilled]  Future Appointments   Date Time Provider Department Center   2/19/2024  8:00 AM [facility] OP 2 [facility]   4/24/2024  8:20 AM Mireille Mccarthy MD [facility]   10/16/2024  9:20 AM Shirin Reed APRN - NP [facility]   10/21/2024  8:10 AM Linnea March MD E [facility]       Requested Prescriptions     Pending Prescriptions Disp Refills    pravastatin (PRAVACHOL) 40 MG tablet [Pharmacy Med Name: PRAVASTATIN SODIUM 40 MG TAB] 90 tablet 1     Sig: TAKE 1 TABLET BY MOUTH EVERY DAY    amLODIPine (NORVASC) 5 MG tablet 90 tablet 1     Sig: Take 1 tablet by mouth daily       Prior labs and Blood pressures:  BP Readings from Last 3 Encounters:   12/21/23 110/69   10/24/23 129/77   10/20/23 137/71     Lab Results   Component Value Date/Time     12/21/2023 09:17 AM    K 3.6 12/21/2023 09:17 AM     12/21/2023 09:17 AM    CO2 32 12/21/2023 09:17 AM    BUN 11 12/21/2023 09:17 AM    GFRAA >60 01/11/2022 07:31 AM     No results found for: \"HBA1C\", \"TDY1BJEX\"  Lab Results   Component Value Date/Time    CHOL 163 04/21/2023 09:09 AM    HDL 52 04/21/2023 09:09 AM    VLDL 19 08/18/2022 03:27 PM     No results found for: \"VITD3\", \"VD3RIA\"    Lab Results   Component Value Date/Time    TSH 1.03 04/21/2023 09:09 AM

## 2024-01-29 NOTE — TELEPHONE ENCOUNTER
From: Carline Mcclain  To: Office of Peb[facility] Bliss  Sent: 1/29/2024 9:36 AM EST  Subject: Medication Renewal Request    Refills have been requested for the following medications:     amLODIPine (NORVASC) 5 MG tablet [Dr. Mireille Mccarthy MD]    Preferred pharmacy: [facility]/PHARMACY #[id number] [facility] [address] [place] [address] [phone] [facility] [phone]

## 2024-01-31 RX ORDER — AMLODIPINE BESYLATE 5 MG/1
5 TABLET ORAL DAILY
Qty: 90 TABLET | Refills: 1 | Status: SHIPPED | OUTPATIENT
Start: 2024-01-31

## 2024-01-31 RX ORDER — PRAVASTATIN SODIUM 40 MG
40 TABLET ORAL DAILY
Qty: 90 TABLET | Refills: 1 | Status: SHIPPED | OUTPATIENT
Start: 2024-01-31

## 2024-02-19 ENCOUNTER — HOSPITAL ENCOUNTER (OUTPATIENT)
Facility: HOSPITAL | Age: 69
Discharge: HOME | End: 2024-02-22
Payer: MEDICARE

## 2024-02-19 DIAGNOSIS — R10.9 RIGHT SIDED ABDOMINAL PAIN: ICD-10-CM

## 2024-02-19 PROCEDURE — 76705 ECHO EXAM OF ABDOMEN: CPT

## 2024-03-06 ENCOUNTER — OFFICE VISIT (OUTPATIENT)
Age: 69
End: 2024-03-06
Payer: MEDICARE

## 2024-03-06 ENCOUNTER — PREP FOR PROCEDURE (OUTPATIENT)
Age: 69
End: 2024-03-06

## 2024-03-06 VITALS
DIASTOLIC BLOOD PRESSURE: 71 MMHG | BODY MASS INDEX: 33.37 KG/M2 | OXYGEN SATURATION: 98 % | TEMPERATURE: 98.1 F | HEART RATE: 78 BPM | RESPIRATION RATE: 20 BRPM | WEIGHT: 207.6 LBS | HEIGHT: 66 IN | SYSTOLIC BLOOD PRESSURE: 125 MMHG

## 2024-03-06 DIAGNOSIS — K80.20 SYMPTOMATIC CHOLELITHIASIS: Primary | ICD-10-CM

## 2024-03-06 PROCEDURE — 1090F PRES/ABSN URINE INCON ASSESS: CPT | Performed by: SURGERY

## 2024-03-06 PROCEDURE — 99204 OFFICE O/P NEW MOD 45 MIN: CPT | Performed by: SURGERY

## 2024-03-06 PROCEDURE — 1036F TOBACCO NON-USER: CPT | Performed by: SURGERY

## 2024-03-06 PROCEDURE — 3017F COLORECTAL CA SCREEN DOC REV: CPT | Performed by: SURGERY

## 2024-03-06 PROCEDURE — G8417 CALC BMI ABV UP PARAM F/U: HCPCS | Performed by: SURGERY

## 2024-03-06 PROCEDURE — 3074F SYST BP LT 130 MM HG: CPT | Performed by: SURGERY

## 2024-03-06 PROCEDURE — G8427 DOCREV CUR MEDS BY ELIG CLIN: HCPCS | Performed by: SURGERY

## 2024-03-06 PROCEDURE — 1123F ACP DISCUSS/DSCN MKR DOCD: CPT | Performed by: SURGERY

## 2024-03-06 PROCEDURE — G8399 PT W/DXA RESULTS DOCUMENT: HCPCS | Performed by: SURGERY

## 2024-03-06 PROCEDURE — G8484 FLU IMMUNIZE NO ADMIN: HCPCS | Performed by: SURGERY

## 2024-03-06 PROCEDURE — 3078F DIAST BP <80 MM HG: CPT | Performed by: SURGERY

## 2024-03-06 ASSESSMENT — PATIENT HEALTH QUESTIONNAIRE - PHQ9
SUM OF ALL RESPONSES TO PHQ QUESTIONS 1-9: 0
2. FEELING DOWN, DEPRESSED OR HOPELESS: 0
SUM OF ALL RESPONSES TO PHQ9 QUESTIONS 1 & 2: 0
SUM OF ALL RESPONSES TO PHQ QUESTIONS 1-9: 0
1. LITTLE INTEREST OR PLEASURE IN DOING THINGS: 0

## 2024-03-06 NOTE — PROGRESS NOTES
General Surgery Office Consultation / H & P    CC: abd pain  History of Present Illness:      Carline Mcclain is a 68 y.o. female who presents with right upper quadrant pain.  Patient reports that the pain started a few months ago.  No clear correlation of food.  Pain is dull in nature but can become sharp.  Generally about a 5 or 6 out of 10.  Time improves the pain.  She thinks the pain is cyclical.  Some acid reflux discomfort as well.  Given PPI recently and takes it intermittently.  No major nausea concerns.  No radiation of pain.  History of an umbilical hernia repair as a child and a hysterectomy.  Here to discuss removal of her gallbladder.      Past Medical History:   Diagnosis Date    Hypercholesterolemia     Hypertension     Menopause     Thyroid disease      Past Surgical History:   Procedure Laterality Date    CYST INCISION AND DRAINAGE Left 2010    benign    FRACTURE SURGERY Right     Right hand    GYN      tubal ligation. Fibroid removal.    HYSTERECTOMY (CERVIX STATUS UNKNOWN)  2016    WISDOM TOOTH EXTRACTION        Family History   Problem Relation Age of Onset    Heart Attack Brother     Breast Cancer Cousin     Diabetes Brother         pre DM    Heart Attack Brother     Diabetes Brother     Cancer Father         Lung cancer    Hypertension Mother      Social History     Socioeconomic History    Marital status:      Spouse name: None    Number of children: None    Years of education: None    Highest education level: None   Tobacco Use    Smoking status: Former     Current packs/day: 0.00     Average packs/day: 0.5 packs/day for 20.0 years (10.0 ttl pk-yrs)     Types: Cigarettes     Start date: 10/15/1979     Quit date: 10/15/1999     Years since quittin.4    Smokeless tobacco: Never   Vaping Use    Vaping Use: Never used   Substance and Sexual Activity    Alcohol use: Yes    Drug use: No    Sexual activity: Yes     Social Determinants of Health     Financial Resource Strain: Low Risk

## 2024-03-06 NOTE — PROGRESS NOTES
Identified patient with two patient identifiers (name and ). Reviewed chart in preparation for visit and have obtained necessary documentation.    Carline Mcclain is a 68 y.o. female  No chief complaint on file.    /71 (Site: Right Upper Arm, Position: Sitting, Cuff Size: Large Adult)   Pulse 78   Temp 98.1 °F (36.7 °C) (Oral)   Resp 20   Ht 1.676 m (5' 6\")   Wt 94.2 kg (207 lb 9.6 oz)   SpO2 98%   BMI 33.51 kg/m²     1. Have you been to the ER, urgent care clinic since your last visit?  Hospitalized since your last visit?no    2. Have you seen or consulted any other health care providers outside of the [facility] since your last visit?  Include any pap smears or colon screening. no

## 2024-03-30 ENCOUNTER — HOSPITAL ENCOUNTER (EMERGENCY)
Facility: HOSPITAL | Age: 69
Discharge: HOME | End: 2024-03-30
Attending: EMERGENCY MEDICINE
Payer: MEDICARE

## 2024-03-30 VITALS
RESPIRATION RATE: 16 BRPM | TEMPERATURE: 98 F | BODY MASS INDEX: 34.07 KG/M2 | DIASTOLIC BLOOD PRESSURE: 76 MMHG | HEART RATE: 84 BPM | WEIGHT: 212 LBS | HEIGHT: 66 IN | OXYGEN SATURATION: 97 % | SYSTOLIC BLOOD PRESSURE: 156 MMHG

## 2024-03-30 DIAGNOSIS — S05.01XA ABRASION OF RIGHT CORNEA, INITIAL ENCOUNTER: Primary | ICD-10-CM

## 2024-03-30 PROCEDURE — 99283 EMERGENCY DEPT VISIT LOW MDM: CPT

## 2024-03-30 PROCEDURE — 6370000000 HC RX 637 (ALT 250 FOR IP): Performed by: EMERGENCY MEDICINE

## 2024-03-30 RX ORDER — OXYCODONE HYDROCHLORIDE AND ACETAMINOPHEN 5; 325 MG/1; MG/1
1 TABLET ORAL EVERY 6 HOURS PRN
Qty: 12 TABLET | Refills: 0 | Status: SHIPPED | OUTPATIENT
Start: 2024-03-30 | End: 2024-04-02

## 2024-03-30 RX ORDER — TETRACAINE HYDROCHLORIDE 5 MG/ML
2 SOLUTION OPHTHALMIC ONCE
Status: COMPLETED | OUTPATIENT
Start: 2024-03-30 | End: 2024-03-30

## 2024-03-30 RX ORDER — OXYCODONE HYDROCHLORIDE AND ACETAMINOPHEN 5; 325 MG/1; MG/1
1 TABLET ORAL ONCE
Status: COMPLETED | OUTPATIENT
Start: 2024-03-30 | End: 2024-03-30

## 2024-03-30 RX ORDER — CIPROFLOXACIN HYDROCHLORIDE 3.5 MG/ML
2 SOLUTION/ DROPS TOPICAL 3 TIMES DAILY
Qty: 5 ML | Refills: 0 | Status: SHIPPED | OUTPATIENT
Start: 2024-03-30 | End: 2024-04-04

## 2024-03-30 RX ORDER — ERYTHROMYCIN 5 MG/G
OINTMENT OPHTHALMIC
COMMUNITY
Start: 2024-03-27

## 2024-03-30 RX ORDER — CIPROFLOXACIN HYDROCHLORIDE 3.5 MG/ML
2 SOLUTION/ DROPS TOPICAL ONCE
Status: COMPLETED | OUTPATIENT
Start: 2024-03-30 | End: 2024-03-30

## 2024-03-30 RX ADMIN — OXYCODONE HYDROCHLORIDE AND ACETAMINOPHEN 1 TABLET: 5; 325 TABLET ORAL at 21:18

## 2024-03-30 RX ADMIN — TETRACAINE HYDROCHLORIDE 2 DROP: 5 SOLUTION OPHTHALMIC at 20:57

## 2024-03-30 RX ADMIN — CIPROFLOXACIN 2 DROP: 3 SOLUTION OPHTHALMIC at 21:18

## 2024-03-30 RX ADMIN — FLUORESCEIN SODIUM 1 MG: 1 STRIP OPHTHALMIC at 20:57

## 2024-03-30 ASSESSMENT — PAIN - FUNCTIONAL ASSESSMENT: PAIN_FUNCTIONAL_ASSESSMENT: PREVENTS OR INTERFERES WITH MANY ACTIVE NOT PASSIVE ACTIVITIES

## 2024-03-30 ASSESSMENT — PAIN DESCRIPTION - LOCATION: LOCATION: EYE

## 2024-03-30 ASSESSMENT — PAIN DESCRIPTION - DESCRIPTORS: DESCRIPTORS: SHARP

## 2024-03-30 ASSESSMENT — PAIN DESCRIPTION - ONSET: ONSET: ON-GOING

## 2024-03-30 ASSESSMENT — PAIN DESCRIPTION - FREQUENCY: FREQUENCY: CONTINUOUS

## 2024-03-30 ASSESSMENT — PAIN DESCRIPTION - ORIENTATION: ORIENTATION: RIGHT

## 2024-03-30 ASSESSMENT — PAIN SCALES - GENERAL: PAINLEVEL_OUTOF10: 8

## 2024-03-30 ASSESSMENT — PAIN DESCRIPTION - PAIN TYPE: TYPE: ACUTE PAIN

## 2024-03-30 NOTE — ED TRIAGE NOTES
Pt ambulates to treatment area without any gait disturbances.  Pt states that her pain initially started Sunday of last week.  [facility] and was told she had pink eye and was given medication.  Pt is here today because the pain has worsened and it hurts to open the eye.  Pt states that her vision is blurry due to the right eye.  Pt also complains of a dry cough    L eye: 20/40  R eye: unable to complete due to unable to open eye because of pain

## 2024-03-31 DIAGNOSIS — I10 ESSENTIAL (PRIMARY) HYPERTENSION: ICD-10-CM

## 2024-03-31 NOTE — ED PROVIDER NOTES
Brother         pre DM    Heart Attack Brother     Diabetes Brother     Cancer Father         Lung cancer    Hypertension Mother           SOCIAL HISTORY       Social History     Socioeconomic History    Marital status:      Spouse name: None    Number of children: None    Years of education: None    Highest education level: None   Tobacco Use    Smoking status: Former     Current packs/day: 0.00     Average packs/day: 0.5 packs/day for 20.0 years (10.0 ttl pk-yrs)     Types: Cigarettes     Start date: 10/15/1979     Quit date: 10/15/1999     Years since quittin.4    Smokeless tobacco: Never   Vaping Use    Vaping Use: Never used   Substance and Sexual Activity    Alcohol use: Yes     Comment: occassionally    Drug use: No    Sexual activity: Yes     Social Determinants of Health     Financial Resource Strain: Low Risk  (2023)    Overall Financial Resource Strain (CARDIA)     Difficulty of Paying Living Expenses: Not hard at all   Transportation Needs: No Transportation Needs (2023)    PRAPARE - Transportation     Lack of Transportation (Medical): No     Lack of Transportation (Non-Medical): No   Physical Activity: Sufficiently Active (2023)    Exercise Vital Sign     Days of Exercise per Week: 6 days     Minutes of Exercise per Session: 100 min   Recent Concern: Physical Activity - Insufficiently Active (2023)    Exercise Vital Sign     Days of Exercise per Week: 1 day     Minutes of Exercise per Session: 20 min   Intimate Partner Violence: Not At Risk (2023)    Humiliation, Afraid, Rape, and Kick questionnaire     Fear of Current or Ex-Partner: No     Emotionally Abused: No     Physically Abused: No     Sexually Abused: No   Housing Stability: Unknown (2023)    Housing Stability Vital Sign     Unable to Pay for Housing in the Last Year: No     Unstable Housing in the Last Year: No           PHYSICAL EXAM    (up to 7 for level 4, 8 or more for level 5)     ED Triage Vitals  Unknown (4/21/2023)    Housing Stability Vital Sign     Unable to Pay for Housing in the Last Year: No     Unstable Housing in the Last Year: No           PHYSICAL EXAM    (up to 7 for level 4, 8 or more for level 5)     ED Triage Vitals [03/30/24 1930]   BP Temp Temp Source Pulse Respirations SpO2 Height Weight - Scale   (!) 156/76 98 °F (36.7 °C) Oral 84 16 97 % 1.676 m (5' 6\") 96.2 kg (212 lb)       Body mass index is 34.22 kg/m².    Physical Exam  Vitals and nursing note reviewed.   Constitutional:       Comments: Patient appears extremely uncomfortable, nontoxic.  Occasional dry cough noted   HENT:      Head: Normocephalic and atraumatic.      Right Ear: Tympanic membrane normal.      Left Ear: Tympanic membrane normal.      Nose: Congestion present.      Mouth/Throat:      Mouth: Mucous membranes are moist.      Comments: Postnasal drip  Eyes:      Comments: Mild scleral injection with watery discharge to the left eye.  No exudate.  Right eyelid was swollen shut.  Able to open eye with some manipulation to place tetracaine drops.  After tetracaine drops were instilled, patient was able to open eye.  She has more scleral injection with watery discharge.  Has 2 abrasions noted to the cornea.  Both appear to be superficial.  Dye uptake was noted at these locations.  No hyphema, negative Maya's.  Abrasions were noted overlying the iris and also in the 3 o'clock position.   Cardiovascular:      Rate and Rhythm: Normal rate and regular rhythm.   Pulmonary:      Effort: Pulmonary effort is normal.      Breath sounds: Normal breath sounds.   Skin:     General: Skin is warm and dry.   Neurological:      General: No focal deficit present.      Mental Status: She is oriented to person, place, and time.         DIAGNOSTIC RESULTS     EKG: All EKG's are interpreted by the Emergency Department Physician who either signs or Co-signs this chart in the absence of a cardiologist.        RADIOLOGY:   Non-plain film images such

## 2024-04-01 ENCOUNTER — TRANSCRIBE ORDERS (OUTPATIENT)
Facility: HOSPITAL | Age: 69
End: 2024-04-01

## 2024-04-01 DIAGNOSIS — Z12.31 VISIT FOR SCREENING MAMMOGRAM: Primary | ICD-10-CM

## 2024-04-01 RX ORDER — HYDROCHLOROTHIAZIDE 12.5 MG/1
12.5 TABLET ORAL DAILY
Qty: 90 TABLET | Refills: 1 | OUTPATIENT
Start: 2024-04-01

## 2024-04-01 RX ORDER — AMLODIPINE BESYLATE 5 MG/1
5 TABLET ORAL DAILY
Qty: 90 TABLET | Refills: 1 | OUTPATIENT
Start: 2024-04-01

## 2024-04-22 ENCOUNTER — HOSPITAL ENCOUNTER (OUTPATIENT)
Facility: HOSPITAL | Age: 69
Discharge: HOME | End: 2024-04-25
Attending: STUDENT IN AN ORGANIZED HEALTH CARE EDUCATION/TRAINING PROGRAM
Payer: MEDICARE

## 2024-04-22 VITALS — HEIGHT: 66 IN | WEIGHT: 205 LBS | BODY MASS INDEX: 32.95 KG/M2

## 2024-04-22 DIAGNOSIS — Z12.31 VISIT FOR SCREENING MAMMOGRAM: ICD-10-CM

## 2024-04-22 PROCEDURE — 77063 BREAST TOMOSYNTHESIS BI: CPT

## 2024-04-22 SDOH — ECONOMIC STABILITY: FOOD INSECURITY: WITHIN THE PAST 12 MONTHS, THE FOOD YOU BOUGHT JUST DIDN'T LAST AND YOU DIDN'T HAVE MONEY TO GET MORE.: NEVER TRUE

## 2024-04-22 SDOH — ECONOMIC STABILITY: FOOD INSECURITY: WITHIN THE PAST 12 MONTHS, YOU WORRIED THAT YOUR FOOD WOULD RUN OUT BEFORE YOU GOT MONEY TO BUY MORE.: NEVER TRUE

## 2024-04-22 SDOH — ECONOMIC STABILITY: TRANSPORTATION INSECURITY
IN THE PAST 12 MONTHS, HAS LACK OF TRANSPORTATION KEPT YOU FROM MEETINGS, WORK, OR FROM GETTING THINGS NEEDED FOR DAILY LIVING?: NO

## 2024-04-22 SDOH — ECONOMIC STABILITY: HOUSING INSECURITY
IN THE LAST 12 MONTHS, WAS THERE A TIME WHEN YOU DID NOT HAVE A STEADY PLACE TO SLEEP OR SLEPT IN A SHELTER (INCLUDING NOW)?: NO

## 2024-04-22 SDOH — ECONOMIC STABILITY: INCOME INSECURITY: HOW HARD IS IT FOR YOU TO PAY FOR THE VERY BASICS LIKE FOOD, HOUSING, MEDICAL CARE, AND HEATING?: NOT HARD AT ALL

## 2024-04-24 ENCOUNTER — OFFICE VISIT (OUTPATIENT)
Facility: CLINIC | Age: 69
End: 2024-04-24

## 2024-04-24 VITALS
TEMPERATURE: 97 F | RESPIRATION RATE: 16 BRPM | SYSTOLIC BLOOD PRESSURE: 106 MMHG | HEIGHT: 66 IN | DIASTOLIC BLOOD PRESSURE: 66 MMHG | WEIGHT: 207.8 LBS | HEART RATE: 71 BPM | BODY MASS INDEX: 33.4 KG/M2 | OXYGEN SATURATION: 98 %

## 2024-04-24 DIAGNOSIS — E78.5 HYPERLIPIDEMIA, UNSPECIFIED HYPERLIPIDEMIA TYPE: ICD-10-CM

## 2024-04-24 DIAGNOSIS — E03.9 ACQUIRED HYPOTHYROIDISM: ICD-10-CM

## 2024-04-24 DIAGNOSIS — I10 ESSENTIAL (PRIMARY) HYPERTENSION: Primary | ICD-10-CM

## 2024-04-24 DIAGNOSIS — R73.03 PREDIABETES: ICD-10-CM

## 2024-04-24 LAB
ANION GAP SERPL CALC-SCNC: 6 MMOL/L (ref 5–15)
BUN SERPL-MCNC: 12 MG/DL (ref 6–20)
BUN/CREAT SERPL: 14 (ref 12–20)
CALCIUM SERPL-MCNC: 9.2 MG/DL (ref 8.5–10.1)
CHLORIDE SERPL-SCNC: 108 MMOL/L (ref 97–108)
CHOLEST SERPL-MCNC: 194 MG/DL
CO2 SERPL-SCNC: 28 MMOL/L (ref 21–32)
CREAT SERPL-MCNC: 0.84 MG/DL (ref 0.55–1.02)
CREAT UR-MCNC: 139 MG/DL
EST. AVERAGE GLUCOSE BLD GHB EST-MCNC: 123 MG/DL
GLUCOSE SERPL-MCNC: 108 MG/DL (ref 65–100)
HBA1C MFR BLD: 5.9 % (ref 4–5.6)
HDLC SERPL-MCNC: 51 MG/DL
HDLC SERPL: 3.8 (ref 0–5)
LDLC SERPL CALC-MCNC: 118.2 MG/DL (ref 0–100)
MICROALBUMIN UR-MCNC: 0.56 MG/DL
MICROALBUMIN/CREAT UR-RTO: 4 MG/G (ref 0–30)
POTASSIUM SERPL-SCNC: 3.8 MMOL/L (ref 3.5–5.1)
SODIUM SERPL-SCNC: 142 MMOL/L (ref 136–145)
TRIGL SERPL-MCNC: 124 MG/DL
TSH SERPL DL<=0.05 MIU/L-ACNC: 0.4 UIU/ML (ref 0.36–3.74)
VLDLC SERPL CALC-MCNC: 24.8 MG/DL

## 2024-04-24 ASSESSMENT — ENCOUNTER SYMPTOMS
RHINORRHEA: 0
NAUSEA: 0
ABDOMINAL PAIN: 0
SHORTNESS OF BREATH: 0
VOMITING: 0
COUGH: 0

## 2024-04-24 NOTE — PROGRESS NOTES
\"Have you been to the ER, urgent care clinic since your last visit?  Hospitalized since your last visit?\"    Yes, [facility]    “Have you seen or consulted any other health care providers outside of [facility] since your last visit?”    no            Click Here for Release of Records Request   
with endocrinology for thyroid nodules.  She denies any other acute concerns.          ROS:   Review of Systems   Constitutional:  Negative for chills and fever.   HENT:  Negative for rhinorrhea.    Respiratory:  Negative for cough and shortness of breath.    Cardiovascular:  Negative for chest pain and leg swelling.   Gastrointestinal:  Negative for abdominal pain, nausea and vomiting.   Neurological:  Negative for dizziness, weakness, numbness and headaches.         Objective:     Vitals:    04/24/24 0814   BP: 106/66   Pulse: 71   Resp: 16   Temp: 97 °F (36.1 °C)   SpO2: 98%          Vitals and Nurse Documentation reviewed.     Physical Exam  Constitutional:       General: She is not in acute distress.     Appearance: Normal appearance. She is obese. She is not ill-appearing.   HENT:      Head: Normocephalic and atraumatic.   Eyes:      Conjunctiva/sclera: Conjunctivae normal.   Cardiovascular:      Rate and Rhythm: Normal rate and regular rhythm.      Heart sounds: Normal heart sounds. No murmur heard.     No friction rub. No gallop.   Pulmonary:      Effort: Pulmonary effort is normal. No respiratory distress.      Breath sounds: Normal breath sounds. No wheezing, rhonchi or rales.   Abdominal:      General: Bowel sounds are normal. There is no distension.      Palpations: Abdomen is soft.      Tenderness: There is no abdominal tenderness. There is no guarding or rebound.   Musculoskeletal:      Cervical back: Neck supple.      Right lower leg: No edema.      Left lower leg: No edema.   Neurological:      Mental Status: She is alert and oriented to person, place, and time.      Gait: Gait normal.         No results found for this visit on 04/24/24.

## 2024-07-22 ASSESSMENT — LIFESTYLE VARIABLES
HOW OFTEN DO YOU HAVE SIX OR MORE DRINKS ON ONE OCCASION: 1
HOW MANY STANDARD DRINKS CONTAINING ALCOHOL DO YOU HAVE ON A TYPICAL DAY: 1
HOW OFTEN DO YOU HAVE A DRINK CONTAINING ALCOHOL: 3

## 2024-07-24 ENCOUNTER — OFFICE VISIT (OUTPATIENT)
Facility: CLINIC | Age: 69
End: 2024-07-24
Payer: MEDICARE

## 2024-07-24 VITALS
WEIGHT: 198 LBS | SYSTOLIC BLOOD PRESSURE: 109 MMHG | BODY MASS INDEX: 31.82 KG/M2 | RESPIRATION RATE: 20 BRPM | HEART RATE: 71 BPM | HEIGHT: 66 IN | DIASTOLIC BLOOD PRESSURE: 69 MMHG | OXYGEN SATURATION: 97 % | TEMPERATURE: 97.5 F

## 2024-07-24 DIAGNOSIS — I10 ESSENTIAL (PRIMARY) HYPERTENSION: ICD-10-CM

## 2024-07-24 DIAGNOSIS — Z00.00 MEDICARE ANNUAL WELLNESS VISIT, SUBSEQUENT: Primary | ICD-10-CM

## 2024-07-24 PROCEDURE — 1123F ACP DISCUSS/DSCN MKR DOCD: CPT | Performed by: STUDENT IN AN ORGANIZED HEALTH CARE EDUCATION/TRAINING PROGRAM

## 2024-07-24 PROCEDURE — G0439 PPPS, SUBSEQ VISIT: HCPCS | Performed by: STUDENT IN AN ORGANIZED HEALTH CARE EDUCATION/TRAINING PROGRAM

## 2024-07-24 PROCEDURE — 3017F COLORECTAL CA SCREEN DOC REV: CPT | Performed by: STUDENT IN AN ORGANIZED HEALTH CARE EDUCATION/TRAINING PROGRAM

## 2024-07-24 PROCEDURE — G8427 DOCREV CUR MEDS BY ELIG CLIN: HCPCS | Performed by: STUDENT IN AN ORGANIZED HEALTH CARE EDUCATION/TRAINING PROGRAM

## 2024-07-24 PROCEDURE — 3074F SYST BP LT 130 MM HG: CPT | Performed by: STUDENT IN AN ORGANIZED HEALTH CARE EDUCATION/TRAINING PROGRAM

## 2024-07-24 PROCEDURE — G8399 PT W/DXA RESULTS DOCUMENT: HCPCS | Performed by: STUDENT IN AN ORGANIZED HEALTH CARE EDUCATION/TRAINING PROGRAM

## 2024-07-24 PROCEDURE — 1090F PRES/ABSN URINE INCON ASSESS: CPT | Performed by: STUDENT IN AN ORGANIZED HEALTH CARE EDUCATION/TRAINING PROGRAM

## 2024-07-24 PROCEDURE — 3078F DIAST BP <80 MM HG: CPT | Performed by: STUDENT IN AN ORGANIZED HEALTH CARE EDUCATION/TRAINING PROGRAM

## 2024-07-24 PROCEDURE — G8417 CALC BMI ABV UP PARAM F/U: HCPCS | Performed by: STUDENT IN AN ORGANIZED HEALTH CARE EDUCATION/TRAINING PROGRAM

## 2024-07-24 PROCEDURE — 99213 OFFICE O/P EST LOW 20 MIN: CPT | Performed by: STUDENT IN AN ORGANIZED HEALTH CARE EDUCATION/TRAINING PROGRAM

## 2024-07-24 PROCEDURE — 1036F TOBACCO NON-USER: CPT | Performed by: STUDENT IN AN ORGANIZED HEALTH CARE EDUCATION/TRAINING PROGRAM

## 2024-07-24 ASSESSMENT — ENCOUNTER SYMPTOMS
ABDOMINAL PAIN: 0
COUGH: 0
NAUSEA: 0
VOMITING: 0
SHORTNESS OF BREATH: 0
RHINORRHEA: 0

## 2024-07-24 NOTE — PATIENT INSTRUCTIONS
chest.     Sweating.     Shortness of breath.     Pain, pressure, or a strange feeling in the back, neck, jaw, or upper belly or in one or both shoulders or arms.     Lightheadedness or sudden weakness.     A fast or irregular heartbeat.   After you call 911, the  may tell you to chew 1 adult-strength or 2 to 4 low-dose aspirin. Wait for an ambulance. Do not try to drive yourself.  Watch closely for changes in your health, and be sure to contact your doctor if you have any problems.  Where can you learn more?  Go to https://www.[employer].net/patientEd and enter F075 to learn more about \"A Healthy Heart: Care Instructions.\"  Current as of: June 24, 2023  Content Version: 14.1  © 6697-3537 [employer].   Care instructions adapted under license by [employer]. If you have questions about a medical condition or this instruction, always ask your healthcare professional. [employer] disclaims any warranty or liability for your use of this information.      Personalized Preventive Plan for Carline Mcclain - 7/24/2024  Medicare offers a range of preventive health benefits. Some of the tests and screenings are paid in full while other may be subject to a deductible, co-insurance, and/or copay.    Some of these benefits include a comprehensive review of your medical history including lifestyle, illnesses that may run in your family, and various assessments and screenings as appropriate.    After reviewing your medical record and screening and assessments performed today your provider may have ordered immunizations, labs, imaging, and/or referrals for you.  A list of these orders (if applicable) as well as your Preventive Care list are included within your After Visit Summary for your review.    Other Preventive Recommendations:    A preventive eye exam performed by an eye specialist is recommended every 1-2 years to screen for glaucoma; cataracts, macular degeneration, and other eye

## 2024-07-24 NOTE — PROGRESS NOTES
Assessment/Plan:     Diagnoses and all orders for this visit:    Medicare annual wellness visit, subsequent  -Annual Medicare wellness questionnaire reviewed  -Patient up-to-date on immunizations  -Screening recommendations reviewed    Essential (primary) hypertension  -Chronic.  Well-controlled.  -Continue amlodipine 5 mg daily      Return in about 3 months (around 10/24/2024), or if symptoms worsen or fail to improve.     Discussed expected course/resolution/complications of diagnosis in detail with patient.    Medication risks/benefits/costs/interactions/alternatives discussed with patient.    Pt expressed understanding with the diagnosis and plan      Subjective:      Carline Mcclain is a 68 y.o. female who presents for had concerns including Medicare AWV (Patient here for a Medicare Wellness visit.).     Current Outpatient Medications   Medication Sig Dispense Refill    pravastatin (PRAVACHOL) 40 MG tablet TAKE 1 TABLET BY MOUTH EVERY DAY 90 tablet 1    amLODIPine (NORVASC) 5 MG tablet Take 1 tablet by mouth daily 90 tablet 1    UNITHROID 75 MCG tablet One tablet by mouth once a day.  Take in AM with water and wait 30-60 minutes before other drink, food or meds.  Take calcium, iron and multi-vitamins 4 hours apart. 90 tablet 3    Estradiol (ESTROGEL) 0.75 MG/1.25 GM (0.06%) GEL Place onto the skin       No current facility-administered medications for this visit.       Allergies   Allergen Reactions    Amoxicillin-Pot Clavulanate Other (See Comments)    Penicillin G Rash     Past Medical History:   Diagnosis Date    Hypercholesterolemia     Hypertension     Menopause     Thyroid disease       Past Surgical History:   Procedure Laterality Date    CYST INCISION AND DRAINAGE Left 2010    benign    FRACTURE SURGERY Right     Right hand    GYN      tubal ligation. Fibroid removal.    HYSTERECTOMY (CERVIX STATUS UNKNOWN)  03/2016     BREAST FINE NEEDLE ASPIRATION Left 05/2010    WISDOM TOOTH EXTRACTION

## 2024-07-24 NOTE — PROGRESS NOTES
dentified pt with two pt identifiers(name and ).    Chief Complaint   Patient presents with    Medicare AWV     Patient here for a Medicare Wellness visit.        Health Maintenance Due   Topic    Respiratory Syncytial Virus (RSV) Pregnant or age 60 yrs+ (1 - 1-dose 60+ series)    COVID-19 Vaccine ( season)    Annual Wellness Visit (Medicare Advantage)        Wt Readings from Last 3 Encounters:   24 89.8 kg (198 lb)   24 93 kg (205 lb)   24 94.3 kg (207 lb 12.8 oz)     Temp Readings from Last 3 Encounters:   24 97.5 °F (36.4 °C) (Temporal)   24 97 °F (36.1 °C) (Temporal)   24 98 °F (36.7 °C) (Oral)     BP Readings from Last 3 Encounters:   24 109/69   24 106/66   24 (!) 156/76     Pulse Readings from Last 3 Encounters:   24 71   24 71   24 84           Coordination of Care Questionnaire:  :   1. \"Have you been to the ER, urgent care clinic since your last visit?  Hospitalized since your last visit?\" no    2. \"Have you seen or consulted any other health care providers outside of the [facility] since your last visit?\" no     3. For patients aged 45-75: Has the patient had a colonoscopy / FIT/ Cologuard? 07/10/19            Colonoscopy scheduled- 24      If the patient is female:    4. For patients aged 40-74: Has the patient had a mammogram within the past 2 years? Yes  2024      5. For patients aged 21-65: Has the patient had a pap smear? 2024     3) Do you have an Advance Directive on file? no  Are you interested in receiving information about Advance Directives? no    Patient is accompanied by self I have received verbal consent from Carline Mcclain to discuss any/all medical information while they are present in the room.

## 2024-07-27 DIAGNOSIS — E78.5 HYPERLIPIDEMIA, UNSPECIFIED HYPERLIPIDEMIA TYPE: Primary | ICD-10-CM

## 2024-07-30 DIAGNOSIS — I10 ESSENTIAL (PRIMARY) HYPERTENSION: ICD-10-CM

## 2024-07-30 RX ORDER — PRAVASTATIN SODIUM 40 MG
40 TABLET ORAL DAILY
Qty: 90 TABLET | Refills: 1 | Status: SHIPPED | OUTPATIENT
Start: 2024-07-30

## 2024-08-01 RX ORDER — AMLODIPINE BESYLATE 5 MG/1
5 TABLET ORAL DAILY
Qty: 90 TABLET | Refills: 1 | Status: SHIPPED | OUTPATIENT
Start: 2024-08-01

## 2024-08-12 ENCOUNTER — ANESTHESIA EVENT (OUTPATIENT)
Facility: HOSPITAL | Age: 69
End: 2024-08-12
Payer: MEDICARE

## 2024-08-12 ENCOUNTER — HOSPITAL ENCOUNTER (OUTPATIENT)
Facility: HOSPITAL | Age: 69
Setting detail: OUTPATIENT SURGERY
Discharge: HOME | End: 2024-08-12
Attending: INTERNAL MEDICINE | Admitting: INTERNAL MEDICINE
Payer: MEDICARE

## 2024-08-12 ENCOUNTER — ANESTHESIA (OUTPATIENT)
Facility: HOSPITAL | Age: 69
End: 2024-08-12
Payer: MEDICARE

## 2024-08-12 VITALS
BODY MASS INDEX: 32.14 KG/M2 | SYSTOLIC BLOOD PRESSURE: 141 MMHG | RESPIRATION RATE: 16 BRPM | WEIGHT: 200 LBS | OXYGEN SATURATION: 98 % | TEMPERATURE: 97.8 F | DIASTOLIC BLOOD PRESSURE: 77 MMHG | HEART RATE: 69 BPM | HEIGHT: 66 IN

## 2024-08-12 PROCEDURE — 2580000003 HC RX 258: Performed by: INTERNAL MEDICINE

## 2024-08-12 PROCEDURE — 7100000011 HC PHASE II RECOVERY - ADDTL 15 MIN: Performed by: INTERNAL MEDICINE

## 2024-08-12 PROCEDURE — 3700000000 HC ANESTHESIA ATTENDED CARE: Performed by: INTERNAL MEDICINE

## 2024-08-12 PROCEDURE — 7100000010 HC PHASE II RECOVERY - FIRST 15 MIN: Performed by: INTERNAL MEDICINE

## 2024-08-12 PROCEDURE — 3600007501: Performed by: INTERNAL MEDICINE

## 2024-08-12 PROCEDURE — 2709999900 HC NON-CHARGEABLE SUPPLY: Performed by: INTERNAL MEDICINE

## 2024-08-12 PROCEDURE — 6360000002 HC RX W HCPCS: Performed by: NURSE ANESTHETIST, CERTIFIED REGISTERED

## 2024-08-12 PROCEDURE — 3600007511: Performed by: INTERNAL MEDICINE

## 2024-08-12 PROCEDURE — 3700000001 HC ADD 15 MINUTES (ANESTHESIA): Performed by: INTERNAL MEDICINE

## 2024-08-12 PROCEDURE — 2500000003 HC RX 250 WO HCPCS: Performed by: NURSE ANESTHETIST, CERTIFIED REGISTERED

## 2024-08-12 RX ORDER — SODIUM CHLORIDE 9 MG/ML
INJECTION, SOLUTION INTRAVENOUS CONTINUOUS
Status: DISCONTINUED | OUTPATIENT
Start: 2024-08-12 | End: 2024-08-12 | Stop reason: HOSPADM

## 2024-08-12 RX ORDER — SODIUM CHLORIDE 0.9 % (FLUSH) 0.9 %
5-40 SYRINGE (ML) INJECTION PRN
Status: DISCONTINUED | OUTPATIENT
Start: 2024-08-12 | End: 2024-08-12 | Stop reason: HOSPADM

## 2024-08-12 RX ORDER — SODIUM CHLORIDE 0.9 % (FLUSH) 0.9 %
5-40 SYRINGE (ML) INJECTION EVERY 12 HOURS SCHEDULED
Status: DISCONTINUED | OUTPATIENT
Start: 2024-08-12 | End: 2024-08-12 | Stop reason: HOSPADM

## 2024-08-12 RX ORDER — LIDOCAINE HYDROCHLORIDE 20 MG/ML
INJECTION, SOLUTION EPIDURAL; INFILTRATION; INTRACAUDAL; PERINEURAL PRN
Status: DISCONTINUED | OUTPATIENT
Start: 2024-08-12 | End: 2024-08-12 | Stop reason: SDUPTHER

## 2024-08-12 RX ORDER — SODIUM CHLORIDE 9 MG/ML
25 INJECTION, SOLUTION INTRAVENOUS PRN
Status: DISCONTINUED | OUTPATIENT
Start: 2024-08-12 | End: 2024-08-12 | Stop reason: HOSPADM

## 2024-08-12 RX ADMIN — PROPOFOL 50 MG: 10 INJECTION, EMULSION INTRAVENOUS at 14:02

## 2024-08-12 RX ADMIN — PROPOFOL 50 MG: 10 INJECTION, EMULSION INTRAVENOUS at 14:06

## 2024-08-12 RX ADMIN — PROPOFOL 50 MG: 10 INJECTION, EMULSION INTRAVENOUS at 13:57

## 2024-08-12 RX ADMIN — PROPOFOL 50 MG: 10 INJECTION, EMULSION INTRAVENOUS at 13:53

## 2024-08-12 RX ADMIN — LIDOCAINE HYDROCHLORIDE 50 MG: 20 INJECTION, SOLUTION EPIDURAL; INFILTRATION; INTRACAUDAL; PERINEURAL at 13:52

## 2024-08-12 RX ADMIN — SODIUM CHLORIDE: 9 INJECTION, SOLUTION INTRAVENOUS at 13:51

## 2024-08-12 RX ADMIN — PROPOFOL 50 MG: 10 INJECTION, EMULSION INTRAVENOUS at 13:52

## 2024-08-12 ASSESSMENT — PAIN - FUNCTIONAL ASSESSMENT
PAIN_FUNCTIONAL_ASSESSMENT: NONE - DENIES PAIN
PAIN_FUNCTIONAL_ASSESSMENT: NONE - DENIES PAIN

## 2024-08-12 NOTE — PROGRESS NOTES
Initial RN admission and assessment performed and documented in Endoscopy navigator.     Patient evaluated by anesthesia in pre-procedure holding.     All procedural vital signs, airway assessment, and level of consciousness information monitored and recorded by anesthesia staff on the anesthesia record.     Report received from CRNA post procedure.  Patient transported to recovery area by RN.    Endoscopy post procedure time out was performed and specimens were verified with physician.    Endoscope was pre-cleaned at bedside immediately following procedure by Milind.

## 2024-08-12 NOTE — H&P
68 y.o. female presents for open access colonoscopy for screening.  Additional H&P data will be attached on the day of procedure.    Alexia Unger Jr, MD

## 2024-08-12 NOTE — PROGRESS NOTES
Endoscopy recovery  Patient returned to baseline, vital signs stable (see vital sign flowsheet). Patient offered liquids and tolerated well. Respiratory status within defined limits. Abdomen soft not tender. Skin with in defined limits. Responsible party driving patient home was given the opportunity to ask questions. Patient discharged with documented belongings.    left knee replacement done 01/06/2020/yes(specify)

## 2024-08-12 NOTE — ANESTHESIA PRE PROCEDURE
Department of Anesthesiology  Preprocedure Note       Name:  Carline Mcclain   Age:  68 y.o.  :  1955                                          MRN:  [id number]         Date:  2024      Surgeon: Surgeon(s):  Alexia Unger MD    Procedure: Procedure(s):  COLONOSCOPY    Medications prior to admission:   Prior to Admission medications    Medication Sig Start Date End Date Taking? Authorizing Provider   amLODIPine (NORVASC) 5 MG tablet Take 1 tablet by mouth daily 24  Yes Mireille Mccarthy MD   pravastatin (PRAVACHOL) 40 MG tablet TAKE 1 TABLET BY MOUTH EVERY DAY 24  Yes Mireille Mccarthy MD   UNITHROID 75 MCG tablet One tablet by mouth once a day.  Take in AM with water and wait 30-60 minutes before other drink, food or meds.  Take calcium, iron and multi-vitamins 4 hours apart. 10/20/23  Yes Linnea March MD   Estradiol (ESTROGEL) 0.75 MG/1.25 GM (0.06%) GEL Place onto the skin   Yes Automatic Reconciliation, Ar       Current medications:    Current Facility-Administered Medications   Medication Dose Route Frequency Provider Last Rate Last Admin   • 0.9 % sodium chloride infusion   IntraVENous Continuous Alexia Unger MD   New Bag at 24 1351   • sodium chloride flush 0.9 % injection 5-40 mL  5-40 mL IntraVENous 2 times per day Alexia Unger MD       • sodium chloride flush 0.9 % injection 5-40 mL  5-40 mL IntraVENous PRN Alexia Unger MD       • 0.9 % sodium chloride infusion  25 mL IntraVENous PRN Alexia Unger MD         Facility-Administered Medications Ordered in Other Encounters   Medication Dose Route Frequency Provider Last Rate Last Admin   • lidocaine PF 2 % injection   IntraVENous PRN Tono Galindo APRN - CRNA   50 mg at 24 1352   • propofol infusion   IntraVENous PRN Tono Galindo APRN - CRNA   50 mg at 24 1402       Allergies:  No Known Allergies    Problem List:    Patient Active Problem List   Diagnosis Code   • Post  patient.                    Jaerd Adhikari MD   8/12/2024

## 2024-08-12 NOTE — OP NOTE
[facility]  [facility]  BRIAN Unger Jr, MD  [phone]      2024    Colonoscopy Procedure Note  Carline Mcclain  :  1955  [facility] Medical Record Number: [id number]    Indications:   Average risk colon cancer screening  PCP:  Mireille Mccarthy MD  Anesthesia/Sedation: See Anesthesia Record  Endoscopist:  Dr. BRIAN Unger Jr  Complications:  None  Estimated Blood Loss:  None    Surgical assistant: Mylesulator: Haley Lyon RN  Endoscopy Technician: Milind Dumont none unless otherwise specified.     Permit:  The indications, risks, benefits and alternatives were reviewed with the patient or their decision maker who was provided an opportunity to ask questions and all questions were answered.  The specific risks of colonoscopy with conscious sedation were reviewed, including but not limited to anesthetic complication, bleeding, adverse drug reaction, missed lesion, infection, IV site reactions, and intestinal perforation which would lead to the need for surgical repair.  Alternatives to colonoscopy including radiographic imaging, observation without testing, or laboratory testing were reviewed including the limitations of those alternatives.  After considering the options and having all their questions answered, the patient or their decision maker provided both verbal and written consent to proceed.        Procedure in Detail:  After obtaining informed consent, positioning of the patient in the left lateral decubitus position, and conduction of a pre-procedure pause or \"time out\" the endoscope was introduced into the anus and advanced to the cecum, which was identified by the ileocecal valve and appendiceal orifice.  The quality of the colonic preparation was good.  A careful inspection was made as the colonoscope was withdrawn, findings and interventions are described

## 2024-08-12 NOTE — INTERVAL H&P NOTE
Pre-Endoscopy H&P Update  Chief complaint/HPI/ROS:  The indication for the procedure, the patient's history and the patient's current medications are reviewed prior to the procedure and that data is reported on the H&P to which this document is attached.  Any significant complaints with regard to organ systems will be noted, and if not mentioned then a review of systems is not contributory.  Past Medical History:   Diagnosis Date    Hypercholesterolemia     Hypertension     Menopause     Thyroid disease       Past Surgical History:   Procedure Laterality Date    CYST INCISION AND DRAINAGE Left     benign    FRACTURE SURGERY Right     Right hand    GYN      tubal ligation. Fibroid removal.    HYSTERECTOMY (CERVIX STATUS UNKNOWN)  2016     BREAST FINE NEEDLE ASPIRATION Left 2010    WISDOM TOOTH EXTRACTION       Social   Social History     Tobacco Use    Smoking status: Former     Current packs/day: 0.00     Average packs/day: 0.5 packs/day for 20.0 years (10.0 ttl pk-yrs)     Types: Cigarettes     Start date: 10/15/1979     Quit date: 10/15/1999     Years since quittin.8    Smokeless tobacco: Never   Substance Use Topics    Alcohol use: Yes     Comment: occassionally      Family History   Problem Relation Age of Onset    Heart Attack Brother     Breast Cancer Cousin     Diabetes Brother         pre DM    Heart Attack Brother     Diabetes Brother     Cancer Father         Lung cancer    Hypertension Mother       Allergies   Allergen Reactions    Amoxicillin-Pot Clavulanate Other (See Comments)    Penicillin G Rash      Prior to Admission Medications   Prescriptions Last Dose Informant Patient Reported? Taking?   Estradiol (ESTROGEL) 0.75 MG/1.25 GM (0.06%) GEL   Yes No   Sig: Place onto the skin   UNITHROID 75 MCG tablet   No No   Sig: One tablet by mouth once a day.  Take in AM with water and wait 30-60 minutes before other drink, food or meds.  Take calcium, iron and multi-vitamins 4 hours apart.

## 2024-08-12 NOTE — DISCHARGE INSTRUCTIONS
[facility]  [address]  [place] [address] [phone]                   Carline Mcclain  [id number]  1955    COLON DISCHARGE INSTRUCTIONS    DISCOMFORT:  Redness at IV site- apply warm compress to area; if redness or soreness persist- contact your physician  There may be a slight amount of blood passed from the rectum  Gaseous discomfort- walking, belching will help relieve any discomfort    DIET:   Regular diet.     ACTIVITY:  You may resume your normal daily activities it is recommended that you spend the remainder of the day resting -  avoid any strenuous activity.  You may not operate a vehicle for 12 hours  You may not engage in an occupation involving machinery or appliances for rest of today  You may not drink alcoholic beverages for at least 12 hours  Avoid making any critical decisions for at least 24 hour    CALL M.D.  ANY SIGN OF:   Increasing pain, nausea, vomiting  Abdominal distension (swelling)  New increased bleeding (oral or rectal)  Fever (chills)  Pain in chest area  Bloody discharge from nose or mouth  Shortness of breath     Follow-up Instructions:   Call you Gastroenterologist for any questions or problems.   Telephone # [phone]  If taken, any biopsy results will be available in  5 to 7 days    Repeat colonscopy in 10 years.  Small hemorrhoids found.

## 2024-08-30 NOTE — ANESTHESIA POSTPROCEDURE EVALUATION
Department of Anesthesiology  Postprocedure Note    Patient: Carline Mcclain  MRN: [id number]  YOB: 1955  Date of evaluation: 8/30/2024    Procedure Summary       Date: 08/12/24 Room / Location: [facility] ENDO 03 / [facility] ENDOSCOPY    Anesthesia Start: 1351 Anesthesia Stop: 1412    Procedure: COLONOSCOPY Diagnosis:       Screen for colon cancer      Family hx colonic polyps      (Screen for colon cancer [Z12.11])      (Family hx colonic polyps [Z83.719])    Surgeons: Alexia Unger MD Responsible Provider: Jared Adhikari MD    Anesthesia Type: MAC ASA Status: 2            Anesthesia Type: MAC    Iam Phase I: Iam Score: 10    Iam Phase II: Iam Score: 10    Anesthesia Post Evaluation    Patient location during evaluation: bedside  Nausea & Vomiting: no nausea  Cardiovascular status: blood pressure returned to baseline  Respiratory status: acceptable  Hydration status: euvolemic    No notable events documented.

## 2024-09-03 DIAGNOSIS — E04.2 MULTIPLE THYROID NODULES: Primary | ICD-10-CM

## 2024-10-02 ENCOUNTER — HOSPITAL ENCOUNTER (OUTPATIENT)
Facility: HOSPITAL | Age: 69
Discharge: HOME | End: 2024-10-05
Attending: INTERNAL MEDICINE
Payer: MEDICARE

## 2024-10-02 DIAGNOSIS — E04.2 MULTIPLE THYROID NODULES: ICD-10-CM

## 2024-10-02 PROCEDURE — 76536 US EXAM OF HEAD AND NECK: CPT

## 2024-10-21 ENCOUNTER — OFFICE VISIT (OUTPATIENT)
Age: 69
End: 2024-10-21
Payer: MEDICARE

## 2024-10-21 VITALS
HEART RATE: 67 BPM | HEIGHT: 66 IN | SYSTOLIC BLOOD PRESSURE: 140 MMHG | DIASTOLIC BLOOD PRESSURE: 69 MMHG | BODY MASS INDEX: 31.82 KG/M2 | WEIGHT: 198 LBS

## 2024-10-21 DIAGNOSIS — E04.2 MULTIPLE THYROID NODULES: Primary | ICD-10-CM

## 2024-10-21 DIAGNOSIS — E03.9 ACQUIRED HYPOTHYROIDISM: ICD-10-CM

## 2024-10-21 PROCEDURE — G8428 CUR MEDS NOT DOCUMENT: HCPCS | Performed by: INTERNAL MEDICINE

## 2024-10-21 PROCEDURE — 1036F TOBACCO NON-USER: CPT | Performed by: INTERNAL MEDICINE

## 2024-10-21 PROCEDURE — G8417 CALC BMI ABV UP PARAM F/U: HCPCS | Performed by: INTERNAL MEDICINE

## 2024-10-21 PROCEDURE — G2211 COMPLEX E/M VISIT ADD ON: HCPCS | Performed by: INTERNAL MEDICINE

## 2024-10-21 PROCEDURE — 1123F ACP DISCUSS/DSCN MKR DOCD: CPT | Performed by: INTERNAL MEDICINE

## 2024-10-21 PROCEDURE — G8484 FLU IMMUNIZE NO ADMIN: HCPCS | Performed by: INTERNAL MEDICINE

## 2024-10-21 PROCEDURE — 3077F SYST BP >= 140 MM HG: CPT | Performed by: INTERNAL MEDICINE

## 2024-10-21 PROCEDURE — G8399 PT W/DXA RESULTS DOCUMENT: HCPCS | Performed by: INTERNAL MEDICINE

## 2024-10-21 PROCEDURE — 3078F DIAST BP <80 MM HG: CPT | Performed by: INTERNAL MEDICINE

## 2024-10-21 PROCEDURE — 1090F PRES/ABSN URINE INCON ASSESS: CPT | Performed by: INTERNAL MEDICINE

## 2024-10-21 PROCEDURE — 99214 OFFICE O/P EST MOD 30 MIN: CPT | Performed by: INTERNAL MEDICINE

## 2024-10-21 PROCEDURE — 3017F COLORECTAL CA SCREEN DOC REV: CPT | Performed by: INTERNAL MEDICINE

## 2024-10-21 RX ORDER — LEVOTHYROXINE SODIUM 75 UG/1
TABLET ORAL
Qty: 90 TABLET | Refills: 3 | Status: SHIPPED | OUTPATIENT
Start: 2024-10-21

## 2024-10-21 NOTE — PROGRESS NOTES
Chief Complaint   Patient presents with    Thyroid Problem       * Since Last Visit: 10/20/2023    No new issues      Had benign FNA of largest (left) thyroid nodule  Had repeat US this month that showed no changes  No new neck symptoms    Switched thyroid hormone replacement to Brand  Is on 75mcg, takes correctly, no missed doses     General:  From initial visit: 10/20/2023    MNG noted first in '17  No FNA at that time  Then re-evaluated recently     Hypothyroid for a few years  On 75mcg  Take in AM with water, eats > 30 min  No iron, calcium or multi-vitamins within 4 hours  On generic     Family History of thyroid disease: Aunt     Medicare     Thyroid Symptoms  denies change in energy, denies change in weight, denies change in appetite, denies sleep issues, denies hair changes, no skin changes, denies sweats, denies hot/cold intolerance, denies tremor, denies palpitations, denies change in bowels, no change in menses, denies mood changes      Neck Symptoms  denies dysphagia, denies anterior neck pain, denies neck swelling, notes no nodules    Labs/Studies   9/30/17 Thyroid US: The right thyroid lobe measures 8.5 x 3.0 x 4.0 cm, previously 8.0 x 4.9 x 3.4 cm. The left thyroid lobe measures 9.6 x 3.6 x 3.2 cm, previously 7.5 x 3.8 x 3.1 cm. The isthmus measures 1.3 cm in thickness, previously 0.8 cm. The gland overall measures larger than the previously seen. There are multiple nodules in the thyroid gland. The largest on the right measures 3.6 x 3.3 x 2.6 cm and 1.9 x 1.7 x 1.5 cm. The largest on the left measure 2.9 x 3.3 x 2.6 cm and 1.9 x 1.9 x 1.7 cm. Comparing the individual nodules is difficult given the differences in imaging technique and measuring technique, but they appear relatively unchanged.    5/5/23 Thyroid US:  Right thyroid: 7.1 x 2.1 x 2.1 cm.  Left thyroid: 5.8 x 1.9 x 2.3 cm.  Isthmus: 0.8 cm.     The thyroid lobes are mildly heterogeneous. In the superior right thyroid lobe, there is a 1.5 x

## 2024-10-24 ENCOUNTER — OFFICE VISIT (OUTPATIENT)
Facility: CLINIC | Age: 69
End: 2024-10-24

## 2024-10-24 VITALS
SYSTOLIC BLOOD PRESSURE: 119 MMHG | WEIGHT: 195 LBS | RESPIRATION RATE: 16 BRPM | TEMPERATURE: 97.1 F | HEIGHT: 66 IN | BODY MASS INDEX: 31.34 KG/M2 | OXYGEN SATURATION: 97 % | HEART RATE: 59 BPM | DIASTOLIC BLOOD PRESSURE: 70 MMHG

## 2024-10-24 DIAGNOSIS — E78.5 HYPERLIPIDEMIA, UNSPECIFIED HYPERLIPIDEMIA TYPE: ICD-10-CM

## 2024-10-24 DIAGNOSIS — I10 ESSENTIAL (PRIMARY) HYPERTENSION: ICD-10-CM

## 2024-10-24 DIAGNOSIS — I10 ESSENTIAL (PRIMARY) HYPERTENSION: Primary | ICD-10-CM

## 2024-10-24 LAB
ALBUMIN SERPL-MCNC: 3.9 G/DL (ref 3.5–5)
ALBUMIN/GLOB SERPL: 1.4 (ref 1.1–2.2)
ALP SERPL-CCNC: 134 U/L (ref 45–117)
ALT SERPL-CCNC: 26 U/L (ref 12–78)
ANION GAP SERPL CALC-SCNC: 4 MMOL/L (ref 2–12)
AST SERPL-CCNC: 9 U/L (ref 15–37)
BILIRUB SERPL-MCNC: 0.3 MG/DL (ref 0.2–1)
BUN SERPL-MCNC: 12 MG/DL (ref 6–20)
BUN/CREAT SERPL: 13 (ref 12–20)
CALCIUM SERPL-MCNC: 9.4 MG/DL (ref 8.5–10.1)
CHLORIDE SERPL-SCNC: 110 MMOL/L (ref 97–108)
CHOLEST SERPL-MCNC: 172 MG/DL
CO2 SERPL-SCNC: 28 MMOL/L (ref 21–32)
CREAT SERPL-MCNC: 0.89 MG/DL (ref 0.55–1.02)
GLOBULIN SER CALC-MCNC: 2.7 G/DL (ref 2–4)
GLUCOSE SERPL-MCNC: 96 MG/DL (ref 65–100)
HDLC SERPL-MCNC: 62 MG/DL
HDLC SERPL: 2.8 (ref 0–5)
LDLC SERPL CALC-MCNC: 90 MG/DL (ref 0–100)
POTASSIUM SERPL-SCNC: 3.9 MMOL/L (ref 3.5–5.1)
PROT SERPL-MCNC: 6.6 G/DL (ref 6.4–8.2)
SODIUM SERPL-SCNC: 142 MMOL/L (ref 136–145)
TRIGL SERPL-MCNC: 100 MG/DL
VLDLC SERPL CALC-MCNC: 20 MG/DL

## 2024-10-24 RX ORDER — AMOXICILLIN 500 MG/1
500 CAPSULE ORAL 3 TIMES DAILY
COMMUNITY
Start: 2024-10-01

## 2024-10-24 RX ORDER — IBUPROFEN 600 MG/1
600 TABLET, FILM COATED ORAL EVERY 6 HOURS PRN
COMMUNITY
Start: 2024-10-11

## 2024-10-24 RX ORDER — HYDROCHLOROTHIAZIDE 12.5 MG/1
12.5 TABLET ORAL DAILY
Qty: 90 TABLET | Refills: 1 | OUTPATIENT
Start: 2024-10-24

## 2024-10-24 SDOH — ECONOMIC STABILITY: FOOD INSECURITY: WITHIN THE PAST 12 MONTHS, THE FOOD YOU BOUGHT JUST DIDN'T LAST AND YOU DIDN'T HAVE MONEY TO GET MORE.: NEVER TRUE

## 2024-10-24 SDOH — ECONOMIC STABILITY: FOOD INSECURITY: WITHIN THE PAST 12 MONTHS, YOU WORRIED THAT YOUR FOOD WOULD RUN OUT BEFORE YOU GOT MONEY TO BUY MORE.: NEVER TRUE

## 2024-10-24 SDOH — ECONOMIC STABILITY: INCOME INSECURITY: HOW HARD IS IT FOR YOU TO PAY FOR THE VERY BASICS LIKE FOOD, HOUSING, MEDICAL CARE, AND HEATING?: NOT HARD AT ALL

## 2024-10-24 ASSESSMENT — ENCOUNTER SYMPTOMS
ABDOMINAL PAIN: 0
COUGH: 0
SHORTNESS OF BREATH: 0
VOMITING: 0
RHINORRHEA: 0
NAUSEA: 0

## 2024-10-24 ASSESSMENT — PATIENT HEALTH QUESTIONNAIRE - PHQ9
SUM OF ALL RESPONSES TO PHQ QUESTIONS 1-9: 0
1. LITTLE INTEREST OR PLEASURE IN DOING THINGS: NOT AT ALL
SUM OF ALL RESPONSES TO PHQ9 QUESTIONS 1 & 2: 0
SUM OF ALL RESPONSES TO PHQ QUESTIONS 1-9: 0
SUM OF ALL RESPONSES TO PHQ QUESTIONS 1-9: 0
2. FEELING DOWN, DEPRESSED OR HOPELESS: NOT AT ALL
SUM OF ALL RESPONSES TO PHQ QUESTIONS 1-9: 0

## 2024-10-24 NOTE — PROGRESS NOTES
1. Have you been to the ER, urgent care clinic since your last visit?  Hospitalized since your last visit?No    2. Have you seen or consulted any other health care providers outside of the [facility] since your last visit?  Include any pap smears or colon screening. No    Chief Complaint   Patient presents with    3 Month Follow-Up     Health Maintenance Due   Topic Date Due    Respiratory Syncytial Virus (RSV) Pregnant or age 60 yrs+ (1 - 1-dose 60+ series) Never done    COVID-19 Vaccine (4 - 2023-24 season) 09/01/2024     PHQ-9 Total Score: 0 (10/24/2024  8:09 AM)        10/24/2024     8:09 AM   Amb Fall Risk Assessment and TUG Test   Do you feel unsteady or are you worried about falling?  no   2 or more falls in past year? no   Fall with injury in past year? no     Vitals:    10/24/24 0809   BP: 119/70   Pulse: 59   Resp: 16   Temp: 97.1 °F (36.2 °C)   SpO2: 97%

## 2024-10-24 NOTE — PROGRESS NOTES
Assessment/Plan:     Diagnoses and all orders for this visit:    Essential (primary) hypertension  -     Comprehensive Metabolic Panel; Future  -Chronic.  Stable.  -Check routine lab work  -Continue amlodipine 5 mg daily    Hyperlipidemia, unspecified hyperlipidemia type  -     Comprehensive Metabolic Panel; Future  -     Lipid Panel; Future  -Chronic.  Presumed stable.  -Repeat lipid panel  -If cholesterol remains elevated would recommend making changes to her statin medication       Please note that this dictation was completed with [employer], the computer voice recognition software. Quite often unanticipated grammatical, syntax, homophones, and other interpretive errors are inadvertently transcribed by the computer software. Please disregard these errors. Please excuse any errors that have escaped final proofreading.      Return in about 6 months (around 4/24/2025), or if symptoms worsen or fail to improve.     Discussed expected course/resolution/complications of diagnosis in detail with patient.    Medication risks/benefits/costs/interactions/alternatives discussed with patient.    Pt expressed understanding with the diagnosis and plan.       Subjective:      Carline Mcclain is a 69 y.o. female who presents for had concerns including 3 Month Follow-Up.     Current Outpatient Medications   Medication Sig Dispense Refill    amoxicillin (AMOXIL) 500 MG capsule Take 1 capsule by mouth 3 times daily      ibuprofen (ADVIL;MOTRIN) 600 MG tablet Take 1 tablet by mouth every 6 hours as needed      UNITHROID 75 MCG tablet One tablet by mouth once a day.  Take in AM with water and wait 30-60 minutes before other drink, food or meds.  Take calcium, iron and multi-vitamins 4 hours apart. 90 tablet 3    amLODIPine (NORVASC) 5 MG tablet Take 1 tablet by mouth daily 90 tablet 1    pravastatin (PRAVACHOL) 40 MG tablet TAKE 1 TABLET BY MOUTH EVERY DAY 90 tablet 1    Estradiol (ESTROGEL) 0.75 MG/1.25 GM (0.06%) GEL Place onto the

## 2024-10-27 DIAGNOSIS — I10 ESSENTIAL (PRIMARY) HYPERTENSION: ICD-10-CM

## 2024-10-28 RX ORDER — AMLODIPINE BESYLATE 5 MG/1
5 TABLET ORAL DAILY
Qty: 90 TABLET | Refills: 1 | Status: SHIPPED | OUTPATIENT
Start: 2024-10-28

## 2024-12-01 ASSESSMENT — SLEEP AND FATIGUE QUESTIONNAIRES
HOW LIKELY ARE YOU TO NOD OFF OR FALL ASLEEP IN A CAR, WHILE STOPPED FOR A FEW MINUTES IN TRAFFIC: WOULD NEVER DOZE
HOW LIKELY ARE YOU TO NOD OFF OR FALL ASLEEP WHILE LYING DOWN TO REST IN THE AFTERNOON WHEN CIRCUMSTANCES PERMIT: MODERATE CHANCE OF DOZING
DO YOU GENERALLY HAVE DIFFICULTY REMEMBERING THINGS BECAUSE YOU ARE SLEEPY OR TIRED: NO
HOW LIKELY ARE YOU TO NOD OFF OR FALL ASLEEP WHILE SITTING INACTIVE IN A PUBLIC PLACE: WOULD NEVER DOZE
HOW LIKELY ARE YOU TO NOD OFF OR FALL ASLEEP WHILE SITTING QUIETLY AFTER LUNCH WITHOUT ALCOHOL: WOULD NEVER DOZE
FOSQ SCORE: 19
HOW LIKELY ARE YOU TO NOD OFF OR FALL ASLEEP WHILE SITTING AND TALKING TO SOMEONE: WOULD NEVER DOZE
HOW LIKELY ARE YOU TO NOD OFF OR FALL ASLEEP WHILE SITTING AND READING: SLIGHT CHANCE OF DOZING
HOW LIKELY ARE YOU TO NOD OFF OR FALL ASLEEP IN A CAR, WHILE STOPPED FOR A FEW MINUTES IN TRAFFIC: WOULD NEVER DOZE
HAS YOUR RELATIONSHIP WITH FAMILY, FRIENDS OR WORK COLLEAGUES BEEN AFFECTED BECAUSE YOU ARE SLEEPY OR TIRED: NO
HOW LIKELY ARE YOU TO NOD OFF OR FALL ASLEEP WHILE SITTING AND TALKING TO SOMEONE: WOULD NEVER DOZE
DO YOU HAVE DIFFICULTY CONCENTRATING ON THE THINGS YOU DO BECAUSE YOU ARE SLEEPY OR TIRED: YES, A LITTLE
DO YOU HAVE DIFFICULTY BEING AS ACTIVE AS YOU WANT TO BE IN THE EVENING BECAUSE YOU ARE SLEEPY OR TIRED: NO
DO YOU HAVE DIFFICULTY OPERATING A MOTOR VEHICLE FOR LONG DISTANCES (GREATER THAN 100 MILES) BECAUSE YOU BECOME SLEEPY: NO
HOW LIKELY ARE YOU TO NOD OFF OR FALL ASLEEP WHILE WATCHING TV: SLIGHT CHANCE OF DOZING
DO YOU HAVE DIFFICULTY BEING AS ACTIVE AS YOU WANT TO BE IN THE MORNING BECAUSE YOU ARE SLEEPY OR TIRED: NO
HOW LIKELY ARE YOU TO NOD OFF OR FALL ASLEEP WHILE LYING DOWN TO REST IN THE AFTERNOON WHEN CIRCUMSTANCES PERMIT: MODERATE CHANCE OF DOZING
HOW LIKELY ARE YOU TO NOD OFF OR FALL ASLEEP WHILE WATCHING TV: SLIGHT CHANCE OF DOZING
HAS YOUR MOOD BEEN AFFECTED BECAUSE YOU ARE SLEEPY OR TIRED: NO
HOW LIKELY ARE YOU TO NOD OFF OR FALL ASLEEP WHEN YOU ARE A PASSENGER IN A CAR FOR AN HOUR WITHOUT A BREAK: SLIGHT CHANCE OF DOZING
HOW LIKELY ARE YOU TO NOD OFF OR FALL ASLEEP WHEN YOU ARE A PASSENGER IN A CAR FOR AN HOUR WITHOUT A BREAK: SLIGHT CHANCE OF DOZING
DO YOU HAVE DIFFICULTY WATCHING A MOVIE OR VIDEO BECAUSE YOU BECOME SLEEPY OR TIRED: YES, A LITTLE
HOW LIKELY ARE YOU TO NOD OFF OR FALL ASLEEP WHILE SITTING AND READING: SLIGHT CHANCE OF DOZING
ESS TOTAL SCORE: 5
HOW LIKELY ARE YOU TO NOD OFF OR FALL ASLEEP WHILE SITTING INACTIVE IN A PUBLIC PLACE: WOULD NEVER DOZE
HOW LIKELY ARE YOU TO NOD OFF OR FALL ASLEEP WHILE SITTING QUIETLY AFTER LUNCH WITHOUT ALCOHOL: WOULD NEVER DOZE
DO YOU HAVE DIFFICULTY VISITING YOUR FAMILY OR FRIENDS IN THEIR HOME BECAUSE YOU BECOME SLEEPY OR TIRED: NO
DO YOU HAVE DIFFICULTY OPERATING A MOTOR VEHICLE FOR SHORT DISTANCES (LESS THAN 100 MILES) BECAUSE YOU BECOME SLEEPY: NO

## 2024-12-03 ENCOUNTER — OFFICE VISIT (OUTPATIENT)
Facility: CLINIC | Age: 69
End: 2024-12-03
Payer: MEDICARE

## 2024-12-03 ENCOUNTER — HOSPITAL ENCOUNTER (OUTPATIENT)
Facility: HOSPITAL | Age: 69
Discharge: HOME | End: 2024-12-06
Payer: MEDICARE

## 2024-12-03 VITALS
HEIGHT: 66 IN | RESPIRATION RATE: 20 BRPM | SYSTOLIC BLOOD PRESSURE: 118 MMHG | HEART RATE: 69 BPM | OXYGEN SATURATION: 96 % | TEMPERATURE: 97 F | BODY MASS INDEX: 31.85 KG/M2 | DIASTOLIC BLOOD PRESSURE: 69 MMHG | WEIGHT: 198.2 LBS

## 2024-12-03 DIAGNOSIS — M25.522 LEFT ELBOW PAIN: Primary | ICD-10-CM

## 2024-12-03 DIAGNOSIS — M79.602 LEFT ARM PAIN: ICD-10-CM

## 2024-12-03 DIAGNOSIS — M25.522 LEFT ELBOW PAIN: ICD-10-CM

## 2024-12-03 DIAGNOSIS — M79.602 LEFT ARM PAIN: Primary | ICD-10-CM

## 2024-12-03 PROCEDURE — 93000 ELECTROCARDIOGRAM COMPLETE: CPT | Performed by: STUDENT IN AN ORGANIZED HEALTH CARE EDUCATION/TRAINING PROGRAM

## 2024-12-03 PROCEDURE — 3078F DIAST BP <80 MM HG: CPT | Performed by: STUDENT IN AN ORGANIZED HEALTH CARE EDUCATION/TRAINING PROGRAM

## 2024-12-03 PROCEDURE — 1123F ACP DISCUSS/DSCN MKR DOCD: CPT | Performed by: STUDENT IN AN ORGANIZED HEALTH CARE EDUCATION/TRAINING PROGRAM

## 2024-12-03 PROCEDURE — G8427 DOCREV CUR MEDS BY ELIG CLIN: HCPCS | Performed by: STUDENT IN AN ORGANIZED HEALTH CARE EDUCATION/TRAINING PROGRAM

## 2024-12-03 PROCEDURE — 1036F TOBACCO NON-USER: CPT | Performed by: STUDENT IN AN ORGANIZED HEALTH CARE EDUCATION/TRAINING PROGRAM

## 2024-12-03 PROCEDURE — G8417 CALC BMI ABV UP PARAM F/U: HCPCS | Performed by: STUDENT IN AN ORGANIZED HEALTH CARE EDUCATION/TRAINING PROGRAM

## 2024-12-03 PROCEDURE — 1090F PRES/ABSN URINE INCON ASSESS: CPT | Performed by: STUDENT IN AN ORGANIZED HEALTH CARE EDUCATION/TRAINING PROGRAM

## 2024-12-03 PROCEDURE — G8399 PT W/DXA RESULTS DOCUMENT: HCPCS | Performed by: STUDENT IN AN ORGANIZED HEALTH CARE EDUCATION/TRAINING PROGRAM

## 2024-12-03 PROCEDURE — G8484 FLU IMMUNIZE NO ADMIN: HCPCS | Performed by: STUDENT IN AN ORGANIZED HEALTH CARE EDUCATION/TRAINING PROGRAM

## 2024-12-03 PROCEDURE — 3074F SYST BP LT 130 MM HG: CPT | Performed by: STUDENT IN AN ORGANIZED HEALTH CARE EDUCATION/TRAINING PROGRAM

## 2024-12-03 PROCEDURE — 99213 OFFICE O/P EST LOW 20 MIN: CPT | Performed by: STUDENT IN AN ORGANIZED HEALTH CARE EDUCATION/TRAINING PROGRAM

## 2024-12-03 PROCEDURE — 1159F MED LIST DOCD IN RCRD: CPT | Performed by: STUDENT IN AN ORGANIZED HEALTH CARE EDUCATION/TRAINING PROGRAM

## 2024-12-03 PROCEDURE — 3017F COLORECTAL CA SCREEN DOC REV: CPT | Performed by: STUDENT IN AN ORGANIZED HEALTH CARE EDUCATION/TRAINING PROGRAM

## 2024-12-03 PROCEDURE — 73080 X-RAY EXAM OF ELBOW: CPT

## 2024-12-03 ASSESSMENT — ENCOUNTER SYMPTOMS
VOMITING: 0
NAUSEA: 0
COUGH: 0
ABDOMINAL PAIN: 0
RHINORRHEA: 0
SHORTNESS OF BREATH: 0

## 2024-12-03 NOTE — PROGRESS NOTES
[address]  [place] [address]  Tel.  [phone]  Fax. [phone] [address]  [place] [address]  Tel.  [phone]  Fax. [phone] [address] [place] [address]  Tel.  [phone]  Fax. [phone]     S>Carline Mcclain is a 69 y.o. female seen for a positive airway pressure follow-up.  She reports no problems using the device.  The following problems are identified:    Drowsiness no Problems exhaling no   Snoring no Forget to put on no   Mask Comfortable yes Can't fall asleep no   Dry Mouth no Mask falls off no   Air Leaking At times Frequent awakenings no     Estimated AHI flow from download shows 0.4/hour.   some significant leak  Averaging 4.5/hours use on nights used  Days with usage 93%  Adherence 63%  Weight from last visit 210 lb      She admits that her sleep has improved.      No Known Allergies    She has a current medication list which includes the following prescription(s): diclofenac sodium, amlodipine, unithroid, pravastatin, estrogel, and ibuprofen..      She  has a past medical history of Fracture, Hypercholesterolemia, Hypertension, Hypothyroid, Menopause, Multiple thyroid nodules, and Sleep apnea.        12/1/2024     8:09 AM 10/9/2023     9:38 AM 10/9/2022     7:45 AM 10/6/2021     2:00 PM 5/26/2021    12:00 PM   Sleep Medicine   Sitting and reading 1 1 1 1 3   Watching TV 1 1 0 1 3   Sitting, inactive in a public place (e.g. a theatre or a meeting) 0 0 0 1 2   As a passenger in a car for an hour without a break 1 0 1 2 3   Lying down to rest in the afternoon when circumstances permit 2 1 2 2 3   Sitting and talking to someone 0 0 0 0 0   Sitting quietly after a lunch without alcohol 0 0 1 0 1   In a car, while stopped for a few minutes in traffic 0 0 0 0 0   [place] Sleepiness Score 5 3 5 7 15   which reflect improved sleep quality over therapy time.    O>    /72 (Site: Left Upper Arm, Position: Sitting, Cuff

## 2024-12-03 NOTE — PROGRESS NOTES
Assessment/Plan:     Diagnoses and all orders for this visit:    Left arm pain  -     AMB POC EKG ROUTINE  -     XR ELBOW LEFT (MIN 3 VIEWS); Future  -Patient endorses 1.5 to 2-month history of intermittent left arm pain  -Brief episodes of sharp radiating pain starting at the elbow  -Could be a form of tendinitis or possible neuropathy  -Due to presentation also obtained an EKG that showed normal sinus rhythm with no acute ischemic changes  -Patient does have an appointment with cardiology next month  -Recommend topical diclofenac as needed  -Also obtain an x-ray of the elbow  -If symptoms are persistent would recommend evaluation by orthopedics.  -ED and return precautions discussed    Left elbow pain  -     XR ELBOW LEFT (MIN 3 VIEWS); Future  -     AFL - Idalia Apple MD, Orthopedic Surgery (elbow, hand, wrist), [place]  -Noted for 1.5 to 2 months intermittently  -See plan above     Please note that this dictation was completed with [employer]smith, the computer voice recognition software. Quite often unanticipated grammatical, syntax, homophones, and other interpretive errors are inadvertently transcribed by the computer software. Please disregard these errors. Please excuse any errors that have escaped final proofreading.      Return if symptoms worsen or fail to improve.     Discussed expected course/resolution/complications of diagnosis in detail with patient.    Medication risks/benefits/costs/interactions/alternatives discussed with patient.    Pt expressed understanding with the diagnosis and plan.       Subjective:      Carline Mcclain is a 69 y.o. female who presents for had concerns including Arm Pain (Patient here for sharp pain in left arm follow up./Feels it is getting worse.).     Current Outpatient Medications   Medication Sig Dispense Refill    diclofenac sodium (VOLTAREN) 1 % GEL Apply 2 g topically 4 times daily as needed for Pain      amLODIPine (NORVASC) 5 MG tablet TAKE 1 TABLET BY MOUTH EVERY DAY

## 2024-12-03 NOTE — PROGRESS NOTES
dentified pt with two pt identifiers(name and ).    Chief Complaint   Patient presents with    Arm Pain     Patient here for sharp pain in left arm follow up.  Feels it is getting worse.        Health Maintenance Due   Topic    COVID-19 Vaccine ( season)       Wt Readings from Last 3 Encounters:   10/24/24 88.5 kg (195 lb)   10/21/24 89.8 kg (198 lb)   24 90.7 kg (200 lb)     Temp Readings from Last 3 Encounters:   10/24/24 97.1 °F (36.2 °C) (Skin)   24 97.8 °F (36.6 °C) (Temporal)   24 97.5 °F (36.4 °C) (Temporal)     BP Readings from Last 3 Encounters:   10/24/24 119/70   10/21/24 (!) 140/69   24 (!) 141/77     Pulse Readings from Last 3 Encounters:   10/24/24 59   10/21/24 67   24 69           Coordination of Care Questionnaire:  :   1. \"Have you been to the ER, urgent care clinic since your last visit?  Hospitalized since your last visit?\" no    2. \"Have you seen or consulted any other health care providers outside of the [facility] since your last visit?\" no     3. For patients aged 45-75: Has the patient had a colonoscopy / FIT/ Cologuard? no      If the patient is female:    4. For patients aged 40-74: Has the patient had a mammogram within the past 2 years? no      5. For patients aged 21-65: Has the patient had a pap smear? no     3) Do you have an Advance Directive on file? no  Are you interested in receiving information about Advance Directives? no    Patient is accompanied by self I have received verbal consent from Carline Mcclain to discuss any/all medical information while they are present in the room.

## 2024-12-03 NOTE — TELEPHONE ENCOUNTER
PCP: Mireille Mccarthy MD    Last appt: [unfilled]  Future Appointments   Date Time Provider Department Center   12/4/2024  9:30 AM Sonja Cash MD [facility] BS AMB   4/24/2025  8:40 AM Mireille Mccarthy MD [facility] DEP   10/21/2025  8:10 AM Linnea March MD [facility] BS AMB       Requested Prescriptions     Pending Prescriptions Disp Refills    diclofenac sodium (VOLTAREN) 1 % GEL [Pharmacy Med Name: DICLOFENAC SODIUM 1% GEL] 100 g      Sig: APPLY 2 G TOPICALLY 4 TIMES DAILY AS NEEDED FOR PAIN APPLY TO AFFECTED JOINT ONLY       Prior labs and Blood pressures:  BP Readings from Last 3 Encounters:   12/03/24 118/69   10/24/24 119/70   10/21/24 (!) 140/69     Lab Results   Component Value Date/Time     10/24/2024 08:40 AM    K 3.9 10/24/2024 08:40 AM     10/24/2024 08:40 AM    CO2 28 10/24/2024 08:40 AM    BUN 12 10/24/2024 08:40 AM    GFRAA >60 01/11/2022 07:31 AM     No results found for: \"HBA1C\", \"[id number]\"  Lab Results   Component Value Date/Time    CHOL 172 10/24/2024 08:40 AM    HDL 62 10/24/2024 08:40 AM    LDL 90 10/24/2024 08:40 AM    .2 04/24/2024 08:51 AM    VLDL 20 10/24/2024 08:40 AM    VLDL 19 08/18/2022 03:27 PM     No results found for: \"VITD3\"    Lab Results   Component Value Date/Time    TSH 0.40 04/24/2024 08:51 AM

## 2024-12-04 ENCOUNTER — OFFICE VISIT (OUTPATIENT)
Age: 69
End: 2024-12-04
Payer: MEDICARE

## 2024-12-04 ENCOUNTER — TELEPHONE (OUTPATIENT)
Age: 69
End: 2024-12-04

## 2024-12-04 VITALS
OXYGEN SATURATION: 98 % | SYSTOLIC BLOOD PRESSURE: 136 MMHG | HEIGHT: 66 IN | TEMPERATURE: 97.9 F | WEIGHT: 198.19 LBS | DIASTOLIC BLOOD PRESSURE: 72 MMHG | BODY MASS INDEX: 31.85 KG/M2 | HEART RATE: 68 BPM

## 2024-12-04 DIAGNOSIS — I10 PRIMARY HYPERTENSION: ICD-10-CM

## 2024-12-04 DIAGNOSIS — G47.33 OBSTRUCTIVE SLEEP APNEA (ADULT) (PEDIATRIC): Primary | ICD-10-CM

## 2024-12-04 PROCEDURE — 1090F PRES/ABSN URINE INCON ASSESS: CPT | Performed by: INTERNAL MEDICINE

## 2024-12-04 PROCEDURE — 3078F DIAST BP <80 MM HG: CPT | Performed by: INTERNAL MEDICINE

## 2024-12-04 PROCEDURE — 1123F ACP DISCUSS/DSCN MKR DOCD: CPT | Performed by: INTERNAL MEDICINE

## 2024-12-04 PROCEDURE — 1036F TOBACCO NON-USER: CPT | Performed by: INTERNAL MEDICINE

## 2024-12-04 PROCEDURE — 99214 OFFICE O/P EST MOD 30 MIN: CPT | Performed by: INTERNAL MEDICINE

## 2024-12-04 PROCEDURE — G8427 DOCREV CUR MEDS BY ELIG CLIN: HCPCS | Performed by: INTERNAL MEDICINE

## 2024-12-04 PROCEDURE — G8417 CALC BMI ABV UP PARAM F/U: HCPCS | Performed by: INTERNAL MEDICINE

## 2024-12-04 PROCEDURE — 1160F RVW MEDS BY RX/DR IN RCRD: CPT | Performed by: INTERNAL MEDICINE

## 2024-12-04 PROCEDURE — 3017F COLORECTAL CA SCREEN DOC REV: CPT | Performed by: INTERNAL MEDICINE

## 2024-12-04 PROCEDURE — 1159F MED LIST DOCD IN RCRD: CPT | Performed by: INTERNAL MEDICINE

## 2024-12-04 PROCEDURE — G8399 PT W/DXA RESULTS DOCUMENT: HCPCS | Performed by: INTERNAL MEDICINE

## 2024-12-04 PROCEDURE — 3075F SYST BP GE 130 - 139MM HG: CPT | Performed by: INTERNAL MEDICINE

## 2024-12-04 PROCEDURE — G8484 FLU IMMUNIZE NO ADMIN: HCPCS | Performed by: INTERNAL MEDICINE

## 2024-12-04 NOTE — TELEPHONE ENCOUNTER
Patient scheduled for yearly follow up and stated her insurance will be changing to [employer] after the 1st of the year. She will update and scan new insurance to [employer]. She will also call [facility] to see what DME company to send the order to and will make a note and/or will call the office to let us know. Once everything comes through please make sure to send everything to new DME or to original DME [employer], so she can get supplies.

## 2024-12-04 NOTE — PATIENT INSTRUCTIONS
causing factor in more than 100,000 police reported crashes annually, resulting in 76,000 injuries and 1,500 deaths. Other surveys suggest 55% of people polled have driven while drowsy in the past year, 23% had fallen asleep but not crashed, 3% crashed, and 2% had and accident due to drowsy driving.  Who is at risk?   Young Drivers: One study of drowsy driving accidents states that 55% of the drivers were under 25 years. Of those, 75% were male.   Shift Workers and Travelers: People who work overnight or travel across time zones frequently are at higher risk of experiencing Circadian Rhythm Disorders. They are trying to work and function when their body is programed to sleep.   Sleep Deprived: Lack of sleep has a serious impact on your ability to pay attention or focus on a task. Consistently getting less than the average of 8 hours your body needs creates partial or cumulative sleep deprivation.   Untreated Sleep Disorders: Sleep Apnea, Narcolepsy, R.L.S., and other sleep disorders (untreated) prevent a person from getting enough restful sleep. This leads to excessive daytime sleepiness and increases the risk for drowsy driving accidents by up to 7 times.  Medications / Alcohol: Even over the counter medications can cause drowsiness. Medications that impair a drivers attention should have a warning label. Alcohol naturally makes you sleepy and on its own can cause accidents. Combined with excessive drowsiness its effects are amplified.   Signs of Drowsy Driving:   * You don't remember driving the last few miles   * You may drift out of your guillermina   * You are unable to focus and your thoughts wander   * You may yawn more often than normal   * You have difficulty keeping your eyes open / nodding off   * Missing traffic signs, speeding, or tailgating  Prevention-   Good sleep hygiene, lifestyle and behavioral choices have the most impact on drowsy driving. There is no substitute for sleep and the average person

## 2025-01-16 ENCOUNTER — CLINICAL DOCUMENTATION (OUTPATIENT)
Age: 70
End: 2025-01-16

## 2025-01-21 DIAGNOSIS — E78.5 HYPERLIPIDEMIA, UNSPECIFIED HYPERLIPIDEMIA TYPE: ICD-10-CM

## 2025-01-21 RX ORDER — PRAVASTATIN SODIUM 40 MG
40 TABLET ORAL DAILY
Qty: 90 TABLET | Refills: 1 | Status: SHIPPED | OUTPATIENT
Start: 2025-01-21

## 2025-01-21 NOTE — TELEPHONE ENCOUNTER
PCP: Mireille Mccarthy MD    Last appt: [unfilled]  Future Appointments   Date Time Provider Department Center   4/24/2025  8:40 AM Mireille Mccarthy MD [facility]   10/21/2025  8:10 AM Linnea March MD RDE [facility] AMB   12/17/2025  9:10 AM Sonja Cash MD [facility] AMB       Requested Prescriptions     Pending Prescriptions Disp Refills    pravastatin (PRAVACHOL) 40 MG tablet [Pharmacy Med Name: PRAVASTATIN SODIUM 40 MG TAB] 90 tablet 1     Sig: TAKE 1 TABLET BY MOUTH EVERY DAY       Prior labs and Blood pressures:  BP Readings from Last 3 Encounters:   12/04/24 136/72   12/03/24 118/69   10/24/24 119/70     Lab Results   Component Value Date/Time     10/24/2024 08:40 AM    K 3.9 10/24/2024 08:40 AM     10/24/2024 08:40 AM    CO2 28 10/24/2024 08:40 AM    BUN 12 10/24/2024 08:40 AM    GFRAA >60 01/11/2022 07:31 AM     No results found for: \"HBA1C\", \"DEK8TTYV\"  Lab Results   Component Value Date/Time    CHOL 172 10/24/2024 08:40 AM    HDL 62 10/24/2024 08:40 AM    LDL 90 10/24/2024 08:40 AM    .2 04/24/2024 08:51 AM    VLDL 20 10/24/2024 08:40 AM    VLDL 19 08/18/2022 03:27 PM     No results found for: \"VITD3\"    Lab Results   Component Value Date/Time    TSH 0.40 04/24/2024 08:51 AM

## 2025-03-06 ENCOUNTER — TELEPHONE (OUTPATIENT)
Age: 70
End: 2025-03-06

## 2025-03-06 NOTE — TELEPHONE ENCOUNTER
Patient came into the office to inquire about her PAP supply order. 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 Informed patient her order was sent to [employer] on 01/16/2025. Provided patient with the contact information to [employer] [facility] and the order for her PAP supplies. Resent order on 03/06/2025.

## 2025-03-25 ENCOUNTER — TELEPHONE (OUTPATIENT)
Age: 70
End: 2025-03-25

## 2025-03-25 NOTE — TELEPHONE ENCOUNTER
Patient called office stating she had not received her PAP supply from [employer]. Order sent with second attempt on 03/06/2025. Offered to call local representative Luther Aquino for further information on patient's PAP order. Patient requested to be established with a new [employer] company. Patient's PAP order sent to [employer] and request to cancel any future PAP supply orders faxed to [employer]. Patient provided with contact information to [employer].

## 2025-04-08 ENCOUNTER — HOSPITAL ENCOUNTER (EMERGENCY)
Facility: HOSPITAL | Age: 70
Discharge: HOME | End: 2025-04-08
Attending: STUDENT IN AN ORGANIZED HEALTH CARE EDUCATION/TRAINING PROGRAM
Payer: MEDICARE

## 2025-04-08 VITALS
BODY MASS INDEX: 31.34 KG/M2 | OXYGEN SATURATION: 97 % | TEMPERATURE: 98.4 F | HEART RATE: 74 BPM | WEIGHT: 195 LBS | RESPIRATION RATE: 18 BRPM | DIASTOLIC BLOOD PRESSURE: 76 MMHG | HEIGHT: 66 IN | SYSTOLIC BLOOD PRESSURE: 156 MMHG

## 2025-04-08 DIAGNOSIS — T78.40XA ALLERGIC REACTION, INITIAL ENCOUNTER: Primary | ICD-10-CM

## 2025-04-08 PROCEDURE — 2500000003 HC RX 250 WO HCPCS: Performed by: PHYSICIAN ASSISTANT

## 2025-04-08 PROCEDURE — 6370000000 HC RX 637 (ALT 250 FOR IP): Performed by: PHYSICIAN ASSISTANT

## 2025-04-08 PROCEDURE — 2580000003 HC RX 258: Performed by: PHYSICIAN ASSISTANT

## 2025-04-08 PROCEDURE — 96375 TX/PRO/DX INJ NEW DRUG ADDON: CPT

## 2025-04-08 PROCEDURE — 6360000002 HC RX W HCPCS: Performed by: PHYSICIAN ASSISTANT

## 2025-04-08 PROCEDURE — 96374 THER/PROPH/DIAG INJ IV PUSH: CPT

## 2025-04-08 PROCEDURE — 99283 EMERGENCY DEPT VISIT LOW MDM: CPT

## 2025-04-08 RX ORDER — FAMOTIDINE 40 MG/1
20 TABLET, FILM COATED ORAL DAILY
Qty: 5 TABLET | Refills: 0 | Status: SHIPPED | OUTPATIENT
Start: 2025-04-08 | End: 2025-04-13

## 2025-04-08 RX ORDER — DIPHENHYDRAMINE HCL 50 MG
50 CAPSULE ORAL NIGHTLY
Qty: 5 CAPSULE | Refills: 0 | Status: SHIPPED | OUTPATIENT
Start: 2025-04-08 | End: 2025-04-13

## 2025-04-08 RX ORDER — FAMOTIDINE 20 MG/1
20 TABLET, FILM COATED ORAL ONCE
Status: COMPLETED | OUTPATIENT
Start: 2025-04-08 | End: 2025-04-08

## 2025-04-08 RX ORDER — 0.9 % SODIUM CHLORIDE 0.9 %
1000 INTRAVENOUS SOLUTION INTRAVENOUS ONCE
Status: COMPLETED | OUTPATIENT
Start: 2025-04-08 | End: 2025-04-08

## 2025-04-08 RX ORDER — DIPHENHYDRAMINE HYDROCHLORIDE 50 MG/ML
25 INJECTION, SOLUTION INTRAMUSCULAR; INTRAVENOUS ONCE
Status: COMPLETED | OUTPATIENT
Start: 2025-04-08 | End: 2025-04-08

## 2025-04-08 RX ORDER — PREDNISONE 20 MG/1
40 TABLET ORAL DAILY
Qty: 10 TABLET | Refills: 0 | Status: SHIPPED | OUTPATIENT
Start: 2025-04-08 | End: 2025-04-13

## 2025-04-08 RX ADMIN — FAMOTIDINE 20 MG: 20 TABLET, FILM COATED ORAL at 17:49

## 2025-04-08 RX ADMIN — SODIUM CHLORIDE 1000 ML: 9 INJECTION, SOLUTION INTRAVENOUS at 17:50

## 2025-04-08 RX ADMIN — DIPHENHYDRAMINE HYDROCHLORIDE 25 MG: 50 INJECTION INTRAMUSCULAR; INTRAVENOUS at 17:45

## 2025-04-08 RX ADMIN — WATER 125 MG: 1 INJECTION INTRAMUSCULAR; INTRAVENOUS; SUBCUTANEOUS at 17:38

## 2025-04-08 ASSESSMENT — LIFESTYLE VARIABLES
HOW OFTEN DO YOU HAVE A DRINK CONTAINING ALCOHOL: NEVER
HOW MANY STANDARD DRINKS CONTAINING ALCOHOL DO YOU HAVE ON A TYPICAL DAY: PATIENT DOES NOT DRINK

## 2025-04-08 ASSESSMENT — PAIN - FUNCTIONAL ASSESSMENT: PAIN_FUNCTIONAL_ASSESSMENT: NONE - DENIES PAIN

## 2025-04-08 NOTE — ED PROVIDER NOTES
[place] EMERGENCY DEPARTMENT  EMERGENCY DEPARTMENT ENCOUNTER      Pt Name: Carline Mcclain  MRN: [id number]  Birthdate 1955  Date of evaluation: 4/8/2025  Provider: Yefri Velasquez PA-C    CHIEF COMPLAINT       Chief Complaint   Patient presents with    Allergic Reaction                HISTORY OF PRESENT ILLNESS   (Location/Symptom, Timing/Onset, Context/Setting, Quality, Duration, Modifying Factors, Severity)  Note limiting factors.   69-year-old F presenting to emergency department for concern of allergic reaction due to blistering rash of lips and scratchy throat  and scratchy throat that started this morning.  Denies abdominal pain, nausea, vomiting, shortness of breath, difficulty breathing.  No known allergies.  Not sure what could have caused symptoms.  Denies new medications, cleansers, foods.            Review of External Medical Records:     Nursing Notes were reviewed.    REVIEW OF SYSTEMS    (2-9 systems for level 4, 10 or more for level 5)     Review of Systems    Except as noted above the remainder of the review of systems was reviewed and negative.       PAST MEDICAL HISTORY     Past Medical History:   Diagnosis Date    Fracture 2015    right hand    Hypercholesterolemia     Hypertension     Hypothyroid     Menopause     Multiple thyroid nodules     Sleep apnea 2020         SURGICAL HISTORY       Past Surgical History:   Procedure Laterality Date    COLONOSCOPY N/A 08/12/2024    COLONOSCOPY performed by Alexia Unger MD at [facility] ENDOSCOPY    CYST INCISION AND DRAINAGE Left 2010    benign    GYN      tubal ligation. Fibroid removal.    HYSTERECTOMY (CERVIX STATUS UNKNOWN)  03/2016    HYSTERECTOMY, VAGINAL  2016    TUBAL LIGATION  1986    UMBILICAL HERNIA REPAIR  1957    US BIOPSY THYROID      benign    US BREAST FINE NEEDLE ASPIRATION Left 05/2010    WISDOM TOOTH EXTRACTION           CURRENT MEDICATIONS       Discharge Medication List as of 4/8/2025  6:59 PM        CONTINUE these

## 2025-04-08 NOTE — DISCHARGE INSTRUCTIONS
Call your primary care provider within 24 hours for close outpatient follow up.  Take medication as prescribed.  Return immediately if any new or worsening symptoms.  Thank you for allowing us to be a part of your care.

## 2025-04-08 NOTE — ED TRIAGE NOTES
Patient ambulatory to triage by self. C/O rash around lips that started this morning. Denies any news medications, cleansers, foods. Stated her throat is scratchy but denies swelling. Patient speaking in clear sentences, no coughing. Taking Amlodipine for BP. No meds PTA.

## 2025-04-18 SDOH — HEALTH STABILITY: PHYSICAL HEALTH: ON AVERAGE, HOW MANY DAYS PER WEEK DO YOU ENGAGE IN MODERATE TO STRENUOUS EXERCISE (LIKE A BRISK WALK)?: 4 DAYS

## 2025-04-18 SDOH — HEALTH STABILITY: PHYSICAL HEALTH: ON AVERAGE, HOW MANY MINUTES DO YOU ENGAGE IN EXERCISE AT THIS LEVEL?: 30 MIN

## 2025-04-18 ASSESSMENT — LIFESTYLE VARIABLES
HOW OFTEN DO YOU HAVE A DRINK CONTAINING ALCOHOL: 2-4 TIMES A MONTH
HOW MANY STANDARD DRINKS CONTAINING ALCOHOL DO YOU HAVE ON A TYPICAL DAY: 1 OR 2
HOW OFTEN DO YOU HAVE A DRINK CONTAINING ALCOHOL: 3
HOW MANY STANDARD DRINKS CONTAINING ALCOHOL DO YOU HAVE ON A TYPICAL DAY: 1
HOW OFTEN DO YOU HAVE SIX OR MORE DRINKS ON ONE OCCASION: 1

## 2025-04-18 ASSESSMENT — PATIENT HEALTH QUESTIONNAIRE - PHQ9
SUM OF ALL RESPONSES TO PHQ QUESTIONS 1-9: 0
2. FEELING DOWN, DEPRESSED OR HOPELESS: NOT AT ALL
SUM OF ALL RESPONSES TO PHQ QUESTIONS 1-9: 0
1. LITTLE INTEREST OR PLEASURE IN DOING THINGS: NOT AT ALL

## 2025-04-21 SDOH — ECONOMIC STABILITY: FOOD INSECURITY: WITHIN THE PAST 12 MONTHS, THE FOOD YOU BOUGHT JUST DIDN'T LAST AND YOU DIDN'T HAVE MONEY TO GET MORE.: NEVER TRUE

## 2025-04-21 SDOH — ECONOMIC STABILITY: TRANSPORTATION INSECURITY
IN THE PAST 12 MONTHS, HAS THE LACK OF TRANSPORTATION KEPT YOU FROM MEDICAL APPOINTMENTS OR FROM GETTING MEDICATIONS?: NO

## 2025-04-21 SDOH — ECONOMIC STABILITY: INCOME INSECURITY: IN THE LAST 12 MONTHS, WAS THERE A TIME WHEN YOU WERE NOT ABLE TO PAY THE MORTGAGE OR RENT ON TIME?: NO

## 2025-04-21 SDOH — ECONOMIC STABILITY: FOOD INSECURITY: WITHIN THE PAST 12 MONTHS, YOU WORRIED THAT YOUR FOOD WOULD RUN OUT BEFORE YOU GOT MONEY TO BUY MORE.: NEVER TRUE

## 2025-04-23 ENCOUNTER — HOSPITAL ENCOUNTER (OUTPATIENT)
Facility: HOSPITAL | Age: 70
Discharge: HOME | End: 2025-04-26
Attending: STUDENT IN AN ORGANIZED HEALTH CARE EDUCATION/TRAINING PROGRAM
Payer: MEDICARE

## 2025-04-23 VITALS — HEIGHT: 66 IN | WEIGHT: 198 LBS | BODY MASS INDEX: 31.82 KG/M2

## 2025-04-23 DIAGNOSIS — Z12.31 VISIT FOR SCREENING MAMMOGRAM: ICD-10-CM

## 2025-04-23 PROCEDURE — 77063 BREAST TOMOSYNTHESIS BI: CPT

## 2025-04-24 ENCOUNTER — OFFICE VISIT (OUTPATIENT)
Facility: CLINIC | Age: 70
End: 2025-04-24
Payer: MEDICARE

## 2025-04-24 VITALS
DIASTOLIC BLOOD PRESSURE: 73 MMHG | TEMPERATURE: 97.8 F | SYSTOLIC BLOOD PRESSURE: 128 MMHG | HEART RATE: 70 BPM | HEIGHT: 66 IN | OXYGEN SATURATION: 98 % | WEIGHT: 207.8 LBS | BODY MASS INDEX: 33.4 KG/M2 | RESPIRATION RATE: 17 BRPM

## 2025-04-24 DIAGNOSIS — R74.8 ELEVATED ALKALINE PHOSPHATASE LEVEL: ICD-10-CM

## 2025-04-24 DIAGNOSIS — R73.03 PREDIABETES: ICD-10-CM

## 2025-04-24 DIAGNOSIS — I10 ESSENTIAL (PRIMARY) HYPERTENSION: ICD-10-CM

## 2025-04-24 DIAGNOSIS — E78.5 HYPERLIPIDEMIA, UNSPECIFIED HYPERLIPIDEMIA TYPE: ICD-10-CM

## 2025-04-24 DIAGNOSIS — Z00.00 MEDICARE ANNUAL WELLNESS VISIT, SUBSEQUENT: Primary | ICD-10-CM

## 2025-04-24 PROCEDURE — 1123F ACP DISCUSS/DSCN MKR DOCD: CPT | Performed by: STUDENT IN AN ORGANIZED HEALTH CARE EDUCATION/TRAINING PROGRAM

## 2025-04-24 PROCEDURE — 3074F SYST BP LT 130 MM HG: CPT | Performed by: STUDENT IN AN ORGANIZED HEALTH CARE EDUCATION/TRAINING PROGRAM

## 2025-04-24 PROCEDURE — 3078F DIAST BP <80 MM HG: CPT | Performed by: STUDENT IN AN ORGANIZED HEALTH CARE EDUCATION/TRAINING PROGRAM

## 2025-04-24 PROCEDURE — 99214 OFFICE O/P EST MOD 30 MIN: CPT | Performed by: STUDENT IN AN ORGANIZED HEALTH CARE EDUCATION/TRAINING PROGRAM

## 2025-04-24 PROCEDURE — 1159F MED LIST DOCD IN RCRD: CPT | Performed by: STUDENT IN AN ORGANIZED HEALTH CARE EDUCATION/TRAINING PROGRAM

## 2025-04-24 PROCEDURE — G0439 PPPS, SUBSEQ VISIT: HCPCS | Performed by: STUDENT IN AN ORGANIZED HEALTH CARE EDUCATION/TRAINING PROGRAM

## 2025-04-24 SDOH — ECONOMIC STABILITY: FOOD INSECURITY: WITHIN THE PAST 12 MONTHS, YOU WORRIED THAT YOUR FOOD WOULD RUN OUT BEFORE YOU GOT MONEY TO BUY MORE.: NEVER TRUE

## 2025-04-24 SDOH — ECONOMIC STABILITY: FOOD INSECURITY: WITHIN THE PAST 12 MONTHS, THE FOOD YOU BOUGHT JUST DIDN'T LAST AND YOU DIDN'T HAVE MONEY TO GET MORE.: NEVER TRUE

## 2025-04-24 ASSESSMENT — ENCOUNTER SYMPTOMS
SHORTNESS OF BREATH: 0
COUGH: 0
RHINORRHEA: 0
VOMITING: 0
ABDOMINAL PAIN: 0
NAUSEA: 0

## 2025-04-24 NOTE — PROGRESS NOTES
Assessment/Plan:     Diagnoses and all orders for this visit:    Medicare annual wellness visit, subsequent  -Annual Medicare wellness questionnaire reviewed  -Immunization recommendations discussed  -Screening recommendations reviewed    Essential (primary) hypertension  -     CBC; Future  -     Comprehensive Metabolic Panel; Future  -     Albumin/Creatinine Ratio, Urine; Future  -Chronic.  Stable.  -Continue amlodipine 5 mg daily  -Check routine lab work today    Hyperlipidemia, unspecified hyperlipidemia type  -     Comprehensive Metabolic Panel; Future  -     Lipid Panel; Future  -Chronic. Presumed stable.  -Check lipid panel  -Continue pravastatin daily    Prediabetes  -     Hemoglobin A1C; Future  -Previous history of prediabetes  -Repeat hemoglobin A1c    Elevated alkaline phosphatase level  -     Alkaline Phosphatase, Isoenzymes; Future  -     Gamma GT; Future  -Previous history of mild elevation in alkaline phosphatase  -Will check additional lab work for further evaluation     Please note that this dictation was completed with [employer], the [employer] voice recognition software. Quite often unanticipated grammatical, syntax, homophones, and other interpretive errors are inadvertently transcribed by the computer software. Please disregard these errors. Please excuse any errors that have escaped final proofreading.      Return in about 6 months (around 10/24/2025).     Discussed expected course/resolution/complications of diagnosis in detail with patient.    Medication risks/benefits/costs/interactions/alternatives discussed with patient.    Pt expressed understanding with the diagnosis and plan      Subjective:      Carline Mcclain is a 69 y.o. female who presents for had concerns including Medicare AWV (Patient here a Medicare Wellness visit.).     Current Outpatient Medications   Medication Sig Dispense Refill    pravastatin (PRAVACHOL) 40 MG tablet TAKE 1 TABLET BY MOUTH EVERY DAY 90 tablet 1    diclofenac

## 2025-04-25 LAB
ALBUMIN SERPL-MCNC: 4.3 G/DL (ref 3.9–4.9)
ALBUMIN/CREAT UR: 2 MG/G CREAT (ref 0–29)
ALP SERPL-CCNC: 150 IU/L (ref 44–121)
ALT SERPL-CCNC: 21 IU/L (ref 0–32)
AST SERPL-CCNC: 17 IU/L (ref 0–40)
BILIRUB SERPL-MCNC: 0.5 MG/DL (ref 0–1.2)
BUN SERPL-MCNC: 12 MG/DL (ref 8–27)
BUN/CREAT SERPL: 15 (ref 12–28)
CALCIUM SERPL-MCNC: 9.3 MG/DL (ref 8.7–10.3)
CHLORIDE SERPL-SCNC: 106 MMOL/L (ref 96–106)
CHOLEST SERPL-MCNC: 235 MG/DL (ref 100–199)
CO2 SERPL-SCNC: 23 MMOL/L (ref 20–29)
CREAT SERPL-MCNC: 0.79 MG/DL (ref 0.57–1)
CREAT UR-MCNC: 213.6 MG/DL
EGFRCR SERPLBLD CKD-EPI 2021: 82 ML/MIN/1.73
ERYTHROCYTE [DISTWIDTH] IN BLOOD BY AUTOMATED COUNT: 13.2 % (ref 11.7–15.4)
GGT SERPL-CCNC: 28 IU/L (ref 0–60)
GLOBULIN SER CALC-MCNC: 2.1 G/DL (ref 1.5–4.5)
GLUCOSE SERPL-MCNC: 109 MG/DL (ref 70–99)
HBA1C MFR BLD: 6.1 % (ref 4.8–5.6)
HCT VFR BLD AUTO: 41 % (ref 34–46.6)
HDLC SERPL-MCNC: 56 MG/DL
HGB BLD-MCNC: 13.7 G/DL (ref 11.1–15.9)
LDLC SERPL CALC-MCNC: 166 MG/DL (ref 0–99)
MCH RBC QN AUTO: 30.6 PG (ref 26.6–33)
MCHC RBC AUTO-ENTMCNC: 33.4 G/DL (ref 31.5–35.7)
MCV RBC AUTO: 92 FL (ref 79–97)
MICROALBUMIN UR-MCNC: 4.8 UG/ML
PLATELET # BLD AUTO: 261 X10E3/UL (ref 150–450)
POTASSIUM SERPL-SCNC: 4.4 MMOL/L (ref 3.5–5.2)
PROT SERPL-MCNC: 6.4 G/DL (ref 6–8.5)
RBC # BLD AUTO: 4.48 X10E6/UL (ref 3.77–5.28)
SODIUM SERPL-SCNC: 143 MMOL/L (ref 134–144)
TRIGL SERPL-MCNC: 78 MG/DL (ref 0–149)
VLDLC SERPL CALC-MCNC: 13 MG/DL (ref 5–40)
WBC # BLD AUTO: 5.8 X10E3/UL (ref 3.4–10.8)

## 2025-04-27 LAB
ALP BONE CFR SERPL: 30 % (ref 14–68)
ALP INTEST CFR SERPL: 5 % (ref 0–18)
ALP LIVER CFR SERPL: 65 % (ref 18–85)

## 2025-04-29 ENCOUNTER — RESULTS FOLLOW-UP (OUTPATIENT)
Facility: CLINIC | Age: 70
End: 2025-04-29

## 2025-07-19 DIAGNOSIS — I10 ESSENTIAL (PRIMARY) HYPERTENSION: ICD-10-CM

## 2025-07-19 DIAGNOSIS — E78.5 HYPERLIPIDEMIA, UNSPECIFIED HYPERLIPIDEMIA TYPE: ICD-10-CM

## 2025-07-21 RX ORDER — AMLODIPINE BESYLATE 5 MG/1
5 TABLET ORAL DAILY
Qty: 90 TABLET | Refills: 1 | Status: SHIPPED | OUTPATIENT
Start: 2025-07-21

## 2025-07-21 RX ORDER — PRAVASTATIN SODIUM 40 MG
40 TABLET ORAL DAILY
Qty: 90 TABLET | Refills: 0 | Status: SHIPPED | OUTPATIENT
Start: 2025-07-21

## (undated) DEVICE — SUPPLEMENT DIGESTIVE H2O SOL GI-EASE